# Patient Record
Sex: FEMALE | Race: WHITE
[De-identification: names, ages, dates, MRNs, and addresses within clinical notes are randomized per-mention and may not be internally consistent; named-entity substitution may affect disease eponyms.]

---

## 2017-02-25 ENCOUNTER — HOSPITAL ENCOUNTER (INPATIENT)
Dept: HOSPITAL 75 - ER | Age: 62
LOS: 5 days | Discharge: NURSING, REHAB OR LONG-TERM CARE | DRG: 494 | End: 2017-03-02
Attending: ORTHOPAEDIC SURGERY | Admitting: ORTHOPAEDIC SURGERY
Payer: MEDICAID

## 2017-02-25 VITALS — DIASTOLIC BLOOD PRESSURE: 58 MMHG | SYSTOLIC BLOOD PRESSURE: 129 MMHG

## 2017-02-25 VITALS — SYSTOLIC BLOOD PRESSURE: 118 MMHG | DIASTOLIC BLOOD PRESSURE: 60 MMHG

## 2017-02-25 VITALS — DIASTOLIC BLOOD PRESSURE: 60 MMHG | SYSTOLIC BLOOD PRESSURE: 118 MMHG

## 2017-02-25 VITALS — BODY MASS INDEX: 53.92 KG/M2 | HEIGHT: 62 IN | WEIGHT: 293 LBS

## 2017-02-25 DIAGNOSIS — G43.909: ICD-10-CM
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
## 2017-02-25 DIAGNOSIS — F32.9: ICD-10-CM

## 2017-02-25 DIAGNOSIS — F17.210: ICD-10-CM

## 2017-02-25 DIAGNOSIS — J44.9: ICD-10-CM

## 2017-02-25 DIAGNOSIS — E03.9: ICD-10-CM

## 2017-02-25 DIAGNOSIS — Z96.653: ICD-10-CM

## 2017-02-25 DIAGNOSIS — S82.851B: Primary | ICD-10-CM

## 2017-02-25 DIAGNOSIS — Z23: ICD-10-CM
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
## 2017-02-25 LAB
ALBUMIN SERPL-MCNC: 3.9 G/DL (ref 3.2–4.5)
ALT SERPL-CCNC: 20 U/L (ref 0–55)
ANION GAP SERPL CALC-SCNC: 10 MMOL/L (ref 5–14)
AST SERPL-CCNC: 21 U/L (ref 5–34)
BASOPHILS # BLD AUTO: 0 10^3/UL (ref 0–0.1)
BASOPHILS NFR BLD AUTO: 0 % (ref 0–10)
BILIRUB SERPL-MCNC: 0.5 MG/DL (ref 0.1–1)
BILIRUB UR QL STRIP: NEGATIVE
BUN SERPL-MCNC: 21 MG/DL (ref 7–18)
BUN/CREAT SERPL: 26
CALCIUM SERPL-MCNC: 8.3 MG/DL (ref 8.5–10.1)
CHLORIDE SERPL-SCNC: 113 MMOL/L (ref 98–107)
CO2 SERPL-SCNC: 20 MMOL/L (ref 21–32)
CREAT SERPL-MCNC: 0.81 MG/DL (ref 0.6–1.3)
EOSINOPHIL # BLD AUTO: 0.1 10^3/UL (ref 0–0.3)
EOSINOPHIL NFR BLD AUTO: 2 % (ref 0–10)
ERYTHROCYTE [DISTWIDTH] IN BLOOD BY AUTOMATED COUNT: 13.7 % (ref 10–14.5)
GFR SERPLBLD BASED ON 1.73 SQ M-ARVRAT: > 60 ML/MIN
GLUCOSE SERPL-MCNC: 107 MG/DL (ref 70–105)
KETONES UR QL STRIP: NEGATIVE
LEUKOCYTE ESTERASE UR QL STRIP: NEGATIVE
LYMPHOCYTES # BLD AUTO: 1.3 X 10^3 (ref 1–4)
LYMPHOCYTES NFR BLD AUTO: 20 % (ref 12–44)
MCH RBC QN AUTO: 29 PG (ref 25–34)
MCHC RBC AUTO-ENTMCNC: 33 G/DL (ref 32–36)
MCV RBC AUTO: 87 FL (ref 80–99)
MONOCYTES # BLD AUTO: 0.4 X 10^3 (ref 0–1)
MONOCYTES NFR BLD AUTO: 6 % (ref 0–12)
NEUTROPHILS # BLD AUTO: 4.7 X 10^3 (ref 1.8–7.8)
NEUTROPHILS NFR BLD AUTO: 72 % (ref 42–75)
NITRITE UR QL STRIP: NEGATIVE
PH UR STRIP: 5 [PH] (ref 5–9)
PLATELET # BLD: 216 10^3/UL (ref 130–400)
PMV BLD AUTO: 9.5 FL (ref 7.4–10.4)
POTASSIUM SERPL-SCNC: 3.7 MMOL/L (ref 3.6–5)
PROT SERPL-MCNC: 6.8 G/DL (ref 6.4–8.2)
PROT UR QL STRIP: (no result)
RBC # BLD AUTO: 4.75 10^6/UL (ref 4.35–5.85)
SODIUM SERPL-SCNC: 143 MMOL/L (ref 135–145)
SP GR UR STRIP: 1.02 (ref 1.02–1.02)
SQUAMOUS #/AREA URNS HPF: (no result) /HPF
UROBILINOGEN UR-MCNC: NORMAL MG/DL
WBC # BLD AUTO: 6.5 10^3/UL (ref 4.3–11)
WBC #/AREA URNS HPF: (no result) /HPF

## 2017-02-25 PROCEDURE — 0QSGXZZ REPOSITION RIGHT TIBIA, EXTERNAL APPROACH: ICD-10-PCS | Performed by: ORTHOPAEDIC SURGERY

## 2017-02-25 PROCEDURE — 51702 INSERT TEMP BLADDER CATH: CPT

## 2017-02-25 PROCEDURE — 81000 URINALYSIS NONAUTO W/SCOPE: CPT

## 2017-02-25 PROCEDURE — 73600 X-RAY EXAM OF ANKLE: CPT

## 2017-02-25 PROCEDURE — 71010: CPT

## 2017-02-25 PROCEDURE — 96375 TX/PRO/DX INJ NEW DRUG ADDON: CPT

## 2017-02-25 PROCEDURE — 93005 ELECTROCARDIOGRAM TRACING: CPT

## 2017-02-25 PROCEDURE — 87081 CULTURE SCREEN ONLY: CPT

## 2017-02-25 PROCEDURE — 85018 HEMOGLOBIN: CPT

## 2017-02-25 PROCEDURE — 36415 COLL VENOUS BLD VENIPUNCTURE: CPT

## 2017-02-25 PROCEDURE — 93041 RHYTHM ECG TRACING: CPT

## 2017-02-25 PROCEDURE — 0QSG35Z REPOSITION RIGHT TIBIA WITH EXTERNAL FIXATION DEVICE, PERCUTANEOUS APPROACH: ICD-10-PCS | Performed by: ORTHOPAEDIC SURGERY

## 2017-02-25 PROCEDURE — 85014 HEMATOCRIT: CPT

## 2017-02-25 PROCEDURE — 96365 THER/PROPH/DIAG IV INF INIT: CPT

## 2017-02-25 PROCEDURE — 80053 COMPREHEN METABOLIC PANEL: CPT

## 2017-02-25 PROCEDURE — 29515 APPLICATION SHORT LEG SPLINT: CPT

## 2017-02-25 PROCEDURE — 73700 CT LOWER EXTREMITY W/O DYE: CPT

## 2017-02-25 PROCEDURE — 73610 X-RAY EXAM OF ANKLE: CPT

## 2017-02-25 PROCEDURE — 85025 COMPLETE CBC W/AUTO DIFF WBC: CPT

## 2017-02-25 PROCEDURE — 82962 GLUCOSE BLOOD TEST: CPT

## 2017-02-25 RX ADMIN — OXYCODONE HYDROCHLORIDE AND ACETAMINOPHEN PRN EACH: 7.5; 325 TABLET ORAL at 22:48

## 2017-02-25 RX ADMIN — SODIUM CHLORIDE, SODIUM LACTATE, POTASSIUM CHLORIDE, AND CALCIUM CHLORIDE PRN MLS/HR: 600; 310; 30; 20 INJECTION, SOLUTION INTRAVENOUS at 13:45

## 2017-02-25 RX ADMIN — SODIUM CHLORIDE, SODIUM LACTATE, POTASSIUM CHLORIDE, AND CALCIUM CHLORIDE PRN MLS/HR: 600; 310; 30; 20 INJECTION, SOLUTION INTRAVENOUS at 14:50

## 2017-02-25 RX ADMIN — CEFAZOLIN SCH MLS/HR: 10 INJECTION, POWDER, FOR SOLUTION INTRAVENOUS at 22:00

## 2017-02-25 RX ADMIN — SODIUM CHLORIDE, SODIUM LACTATE, POTASSIUM CHLORIDE, AND CALCIUM CHLORIDE SCH MLS/HR: 600; 310; 30; 20 INJECTION, SOLUTION INTRAVENOUS at 17:39

## 2017-02-25 RX ADMIN — Medication SCH ML: at 22:00

## 2017-02-25 NOTE — XMS REPORT
Continuity of Care Document

 Created on: 2015



BELKIS ROSARIO

External Reference #: [id number]

: 1955

Sex: Female



Demographics







 Address  [address]

[place]  [address]

 

 Home Phone  [phone]

 

 Preferred Language  English

 

 Marital Status  Unknown

 

 Scientologist Affiliation  Unknown

 

 Race  Unknown

 

 Ethnic Group  Unknown





Author







 Author  [facility]

 

 Organization  [facility]

 

 Address  Unknown

 

 Phone  Unavailable







Support







 Name  Relationship  Address  Phone

 

 MAUREEN PENG DO  Caregiver  [address] [place]  [address] [phone]

 

 JAYLENE PIPER MD  Caregiver  [address]  [address] [phone]

 

 TADEO ALEXIS  Next Of Kin  [address] [place]  [address] [phone]







Care Team Providers







 Care Team Member Name  Role  Phone

 

 JAYLENE PIPER MD  PCP  [phone]







Insurance Providers







 Payer Name  Policy Number  Subscriber Name  Relationship

 

 [facility]  [id number]  Belkis Roasrio  20 Self / Same As Patient







Advance Directives







 Directive  Response  Recorded Date/Time

 

 Advance Directives  No  04/28/15 11:05am

 

 Health Care Power of   No  04/28/15 11:05am

 

 Organ Donor  No  04/28/15 11:05am

 

 Resuscitation Status  Full Code  04/28/15 11:05am







Problems

No known problems or medical conditions.



Medications







 Medication  Dose  Route  Sig  Days/Qty  Instructions  Order Date  Discontinued 
Date  Status

 

 Trazodone HCl  300 Mg  PO  DAILY        08  Discontinued

 

 Citalopram Hydrobromide                 03/21/08  03/12/10  Discontinued

 

 Oxybutynin Chloride  5 Mg  PO  TWICE A DAY        08     Active

 

 Albuterol                 08  Discontinued

 

 [Symbicort]                 08  Discontinued

 

 Naproxen  1 Each  PO  BID PRN  20 Qty     10/15/09  08/01/11  Discontinued

 

 Acetaminophen/Hydrocodone Bitart  1 Ea  PO  Q 4 - 6 HR PRN  10 Qty     10/15/
09  03/12/10  Discontinued

 

 Propoxyphene HCl/Acetaminophen                 10/15/09  03/12/10  Discontinued

 

 Duloxetine HCl  90 Mg  PO  DAILY        03/12/10     Active

 

 Famotidine (Pepcid)  1 Each  PO  DAILY  15 Qty  FOR STOMACH  03/12/10  08/01/
11  Discontinued

 

 Metoclopramide HCl (Reglan)  1 Each  PO  BEFORE MEALS AND AT BEDTIME  20 Qty  
   03/12/10  08/01/11  Discontinued

 

 [Pink Pain Pill]                 04/24/10  03/18/11  Discontinued

 

 Cephalexin Monohydrate (Keflex)  1 Each  PO  FOUR TIMES DAILY  7 Days     04/24
/10  08/01/11  Discontinued

 

 Diclofenac Sodium  75 Mg  PO  DAILY        11  Discontinued

 

 Propranolol Hcl  40 Mg  PO  TWICE A DAY        11  Discontinued

 

 [Diflucan]  100     DAILY  5 Qty     11  Discontinued

 

 Trazodone Hcl  150 Mg  PO  BEDTIME     1-2 as needed  07/11/12  04/28/15  
Discontinued

 

 Diclofenac Sodium  50 Mg  PO  TWICE A DAY        07/11/12  04/28/15  
Discontinued

 

 Topiramate  2 Tab  PO  DAILY        12  Discontinued

 

 Topiramate  1 Tab  PO  BEDTIME        12  Discontinued

 

 Tolterodine Tartrate  1 Each  PO  TWICE A DAY        12  
Discontinued

 

 Imipramine Hcl  25 Mg  PO  BEDTIME        12  Discontinued

 

 Prednisone  20 Mg  PO  TWICE A DAY  3 Days     12  Discontinued

 

 Budesonide/Formoterol Fumarate  10.2 Gm  IH           13     Active

 

 Desmopressin Acetate  0.6 Mg  PO  BEDTIME        13     Active

 

 Fesoterodine Fumarate  8 Mg  PO           13     Active

 

 Simvastatin  10 Mg  PO  BEDTIME        13     Active

 

 Topiramate  2 Tab  PO  DAILY  30 Qty     13     Active

 

 Sumatriptan Succinate  50 Mg  PO           13     Active

 

 Methylprednisolone  1 Packet  PO  AS DIRECTED  1 Qty     06/14/13  04/28/15  
Discontinued

 

 Azithromycin  1 Tab  PO  DAILY  4 Days     06/14/13  04/28/15  Discontinued

 

 Benzonatate (Tessalon Perles)  1 Each  PO  TID PRN  14 Qty     06/14/13  04/28/
15  Discontinued

 

 Levothyroxine Sodium (Levothroid)  100 Mcg  PO  DAILY        04/28/15     
Active

 

 Naproxen  500 Mg  PO  DAILY        04/28/15     Active

 

 Mirtazapine (Remeron)  1 Each  PO  BEDTIME        04/28/15     Active

 

 Tramadol Hcl  50 Mg  PO           04/28/15     Active

 

 Albuterol Sulfate (Proventil Nebs)  1 Each  IH  Q 4 - 6 HRS PRN        04/28/
15     Active







Social History







 Social History Problem  Response  Recorded Date/Time

 

 Alcohol Use  Denies Use  2013 8:59pm

 

 Recreational Drug Use  No  2013 8:59pm

 

 Recent Foreign Travel  No  2015 11:05am

 

 Smoking Status  Current Everyday Smoker  2015 11:05am

 

 Do you dip or chew tobacco?  No  2015 11:05am









 Query  Response  Start Date  Stop Date

 

 Smoking Status  Current Everyday Smoker      







Hospital Discharge Instructions

No hospital discharge instructions.



Plan of Care

No plan of care.



Functional Status

No functional status results.



Allergies, Adverse Reactions, Alerts







 Allergen  Type  Severity  Reaction  Status  Last Updated

 

 No Known Drug Allergies           Active  08

 

 Codeine  Allergy  Mild     Active  10/15/09







Immunizations







 Name  Given  Type

 

 Date of Pneumonia Vaccine  11  Historical







Vital Signs

Acute Vital Signs





 Vital  Response  Date/Time

 

 Temperature (Fahrenheit)  97.7 degrees F (97.6 - 99.5)   

 

 Temperature (Calculated Celsius)  36.35815 degrees C (36.4 - 37.5)   

 

 Temperature Source  Tympanic     

 

 Pulse Rate (adult)  85 bpm (60 - 90)   

 

 Respiratory Rate  20 bpm (12 - 24)   

 

 O2 Sat by Pulse Oximetry  95 % (88 - 100)   

 

 Blood Pressure  130/80 mm Hg   

 

 Pain      

 

    Pain Intensity  0     

 

 Height (Feet)  5 feet    

 

 Height (Inches)  3.00 inches    

 

 Height (Calculated Centimeters)  160.830001 cm    

 

 Weight (Pounds)  290 pounds    

 

 Weight (Calculated Grams)  987681.789 gm    

 

 Weight (Calculated Kilograms)  131.902840 kilograms    

 

 Height  5 ft 3 in    

 

 Weight  290 lb    

 

 Body Mass Index  51.4 kg/m^2    







Results

Laboratory Results







 Test Name  Result  Units  Flags  Reference  Collection Date/Time  Result Date/
Time  Comments

 

 White Blood Count  5.1  10^3/uL     4.3-11.0  2015 11:05am  2015 11
:23am   

 

 Red Blood Count  4.81  10^6/uL     4.35-5.85  2015 11:05am  2015 11
:23am   

 

 Hemoglobin  14.0  G/DL     11.5-16.0  2015 11:05am  2015 11:23am   

 

 Hematocrit  41  %     35-52  2015 11:05am  2015 11:23am   

 

 Mean Corpuscular Volume  85  FL     80-99  2015 11:05am  2015 11:
23am   

 

 Mean Corpuscular Hemoglobin  29  PG     25-34  2015 11:05am  2015 
11:23am   

 

 Mean Corpuscular Hemoglobin Concent  34  G/DL     32-36  2015 11:05am   11:23am   

 

 Red Cell Distribution Width  13.2  %     10.0-14.5  2015 11:05am  2015 11:23am   

 

 Platelet Count  206  10^3/uL     130-400  2015 11:05am  2015 11:
23am   

 

 Mean Platelet Volume  9.5  FL     7.4-10.4  2015 11:05am  2015 11:
23am   

 

 Neutrophils (%) (Auto)  68  %     42-75  2015 11:05am  2015 11:
23am   

 

 Lymphocytes (%) (Auto)  23  %     12-44  2015 11:05am  2015 11:
23am   

 

 Monocytes (%) (Auto)  6  %     0-12  2015 11:05am  2015 11:23am   

 

 Eosinophils (%) (Auto)  2  %     0-10  2015 11:05am  2015 11:23am 
  

 

 Basophils (%) (Auto)  0  %     0-10  2015 11:05am  2015 11:23am   

 

 Neutrophils # (Auto)  3.5  X 10^3     1.8-7.8  2015 11:05am  2015 
11:23am   

 

 Lymphocytes # (Auto)  1.2  X 10^3     1.0-4.0  2015 11:05am  2015 
11:23am   

 

 Monocytes # (Auto)  0.3  X 10^3     0.0-1.0  2015 11:05am  2015 11:
23am   

 

 Eosinophils # (Auto)  0.1  10^3/uL     0.0-0.3  2015 11:05am  2015 
11:23am   

 

 Basophils # (Auto)  0.0  10^3/uL     0.0-0.1  2015 11:05am  2015 11
:23am   

 

 Prothrombin Time  12.5  SEC     12.2-14.7  2015 11:05am  2015 11:
31am   

 

 INR Comment  1.0        0.8-1.4  2015 11:052015 11:31am       
                INTERPRETIVE DATA

SUGGESTED THERAPEUTIC RANGE FOR INR'S:

   VENOUS THROMBOSIS, PULMONARY EMBOLISM, OR PREVENTION OF

   SYSTEMIC EMBOLISM (EG. IN ATRIAL FIBRILLATION):

                     2.0  -  3.0

   MECHANICAL PROSTHETIC HEART VALVES:

                     2.5  -  3.5*

*NOTE:  INR'S UP TO 4.5 MAY BE NECESSARY IN SELECTED GROUPS 

        OF HIGH RISK PATIENTS.

[employer] CONSENSUS

CONFERENCE ON ANTITHROMBOTIC THERAPY (2000).

 

 Sodium Level  142  MMOL/L     135-145  2015 11:05am  2015 11:37am 
  

 

 Potassium Level  3.7  MMOL/L     3.6-5.0  2015 11:05am  2015 11:
37am   

 

 Chloride Level  110  MMOL/L  H    2015 11:052015 11:37am
   

 

 Carbon Dioxide Level  23  MMOL/L     21-32  2015 11:05am  2015 11:
37am   

 

 Blood Urea Nitrogen  14  MG/DL     7-18  2015 11:052015 11:
37am   

 

 Creatinine  0.75  MG/DL     0.60-1.30  2015 11:05am  2015 11:37am 
  

 

 BUN/Creatinine Ratio  19           2015 11:05am  2015 11:37am   

 

 Estimat Glomerular Filtration Rate  > 60           2015 11:052015 11:37am                    GFR INTERPRETIVE DATA



         UNITS FOR ESTIMATED GFR (eGFR): mL/min/1.73 M2

         REFERENCE RANGE FOR ESTIMATED GFR (eGFR)

                                          eGFR

         NORMAL eGFR                       >60

         MODERATELY DECREASED eGFR          30-59

         SEVERLY DECREASED eGFR             15-29

         KIDNEY FAILURE                    <15 (OR DIALYSIS)

 

 Glucose Level  109  MG/DL  H    2015 11:05am  2015 11:37am   

 

 Calcium Level  9.1  MG/DL     8.5-10.1  2015 11:05am  2015 11:37am
   







Procedures







 Procedure  Status  Date  Provider(s)

 

 Diagnostic colonoscopy  completed  04/28/15  MAUREEN PENG DO

 

 Diagnostic colonoscopy  completed  04/28/15  MAUREEN PENG DO







Encounters







 Encounter  Location  Date/Time

 

 Registered Surgical Day Care  [facility]  04/28/15 10:27am

 

 Registered Clinic  [facility]  04/24/15 7:31am

## 2017-02-25 NOTE — DIAGNOSTIC IMAGING REPORT
EXAMINATION: Portable ereCT AP chest at 1242h.



INDICATION: Preop ankle fracture



Heart size is within normal limits and stable when compared to

6/14/13. The lungs are clear. There is no evidence for a

failure, pneumonia or for a pleural effusion. The mediastinum is

not widened. The osseous structures are intact.



IMPRESSION:



There is no evidence for an acute cardiopulmonary abnormality.



Dictated by:



Dictated on workstation # [id number]

## 2017-02-25 NOTE — ED LOWER EXTREMITY
General


Chief Complaint:  Trauma-Non Activation


Stated Complaint:  OPEN ANKLE FX


Source:  patient, EMS


Exam Limitations:  no limitations





History of Present Illness


Time seen by provider:  11:20


Initial Comments


The patient is a 61-year-old white female brought by ambulance.  She reported 

that she had gotten up this morning to go to the bathroom.  Her knee buckled 

and she fell to the floor.  She was unable to get up.  When EMS arrived they 

found that she had an open fracture possible dislocation of the right ankle.  

It is noted that she has previous bilateral total knees and a previous 

operative left ankle repair.


Onset:  just prior to arrival, this morning


Severity:  moderate, severe


Pain/Injury Location:  right ankle


Method of Injury:  fell, twisted





Allergies and Home Medications


Allergies


Coded Allergies:  


     codeine (Unverified  Allergy, Mild, 10/15/09)





Home Medications


Albuterol Sulfate 0.63 Mg/3 Ml Vial.neb 1 EACH IH Q 4 - 6 HRS PRN (Reported) 


Budesonide/Formoterol Fumarate 10.2 Gm Hfa.aer.ad 10.2 GM IH (Reported) 


Desmopressin Acetate 0.2 Mg Tablet 0.6 MG PO HS (Reported) 


Duloxetine Hcl 60 Mg Capsule.dr 90 MG PO DAILY (Reported) 


Fesoterodine Fumarate 8 Mg Tab.sr.24h 8 MG PO (Reported) 


Levothyroxine Sodium 100 Mcg Tablet 100 MCG PO DAILY (Reported) 


Mirtazapine 15 Mg Tablet 1 EACH PO HS (Reported) 


Naproxen 500 Mg Tablet 500 MG PO DAILY (Reported) 


Oxybutynin Chloride 5 Mg Tablet 5 MG PO BID (Reported) 


Simvastatin 10 Mg Tablet 10 MG PO HS (Reported) 


Sumatriptan Succinate 50 Mg Tablet 50 MG PO (Reported) 


Topiramate 25 Mg Tablet #30 2 TAB PO DAILY (Reported) 


Tramadol Hcl 50 Mg Tablet 50 MG PO (Reported) 





Constitutional:   see HPI


EENTM:   no symptoms reported


Respiratory:   no symptoms reported


Cardiovascular:   no symptoms reported


Gastrointestinal:   no symptoms reported


Genitourinary:   no symptoms reported


Musculoskeletal:   see HPI


Psychiatric/Neurological:   No Symptoms Reported





Past Medical-Social-Family Hx


Patient Social History


Alcohol Use:  Denies Use


Recreational Drug Use:  No


Smoking Status:  Current Everyday Smoker


Recent Hopitalizations:  Yes





Immunizations Up To Date


Date of Pneumonia Vaccine:  Apr 1, 2011





Surgeries


HX Surgeries:  Yes (c-sections,knee and ankle surgery)





Respiratory


Hx Respiratory Disorders:  Yes (COPD)


Respiratory Disorders:  Asthma, Chronic Bronchitis, COPD





Cardiovascular


Hx Cardiac Disorders:  No





Neurological


Hx Neurological Disorders:  No





Reproductive System


Hx Reproductive Disorders:  No





Genitourinary


Hx Genitourinary Disorders:  No





Gastrointestinal


Hx Gastrointestinal Disorders:  No





Musculoskeletal


Hx Musculoskeletal Disorders:  Yes (ARTHRITIS)


Musculoskeletal Disorders:  Arthritis





Endocrine


Hx Endocrine Disorders:  Yes


Endocrine Disorders:  Hypothyroidsim





HEENT


HX ENT Disorders:  No





Cancer


Hx Cancer:  No





Psychosocial


Hx Psychiatric Problems:  Yes


Behavioral Health Disorders:  Depression





Integumentary


HX Skin/Integumentary Disorder:  No





Blood Transfusions


Hx Blood Disorders:  No





Physical Exam


Vital Signs


Capillary Refill :


General Appearance:   moderate distress


HEENT:   normal ENT inspection


Neck:   non-tender full range of motion supple normal inspection


Cardiovascular:   normal peripheral pulses regular rate, rhythm no edema no 

gallop no JVD no murmur


Respiratory:   chest non-tender lungs clear normal breath sounds no respiratory 

distress no accessory muscle use


Gastrointestinal:   normal bowel sounds non tender soft no organomegaly no 

pulsatile mass


Neurologic/Psychiatric:   CNs II-XII nml as tested no motor/sensory deficits 

alert normal mood/affect oriented x 3


Skin:   normal color warm/dry


Comments


the patient has obvious deformity of the right ankle.  There is a large skin 

defect just distal to the medial malleolus.  This involves the area of the 

dorsalis pedis artery.  The dorsalis pedis is not palpated there is capillary 

return promptly on the toes.  The patient was discussed with Dr. Mendieta of the 

orthopedic service.  He was just about to scrub into an operation and 

graciously came to the emergency room to reduce the fracture.  This was 

accomplished with the use of 1 mg of hydromorphone and 20 mg IV push of 

etomidate.  The ankle was reduced with an obvious and satisfying thump.  It was 

then dressed and immobilized.  The patient was given 2 g of IV Ancef as a 

prophylaxis.  The plan is to go to the OR later today Dr. Mendieta arrived in 

the ER at approximately 1130.





Progress/Results/Core Measures


Results/Orders


Lab Results








 Laboratory Tests








Test


  2/25/17


11:32 Range/Units


 











My Orders





 Orders-RAYA WEBSTER MD


Hydromorphone Injection (Dilaudid Inject (2/25/17 11:28)


Etomidate Injection (Amidate Injection) (2/25/17 11:35)


Cefazolin  Injection (Ancef  Injection) (2/25/17 12:00)


Ekg Tracing (2/25/17 11:50)


Cbc With Automated Diff (2/25/17 11:50)


Comprehensive Metabolic Panel (2/25/17 11:50)


Ua Culture If Indicated (2/25/17 11:50)


Cefazolin  Injection (Ancef  Injection) (2/25/17 11:50)


Ankle, Right, 2 Views (2/25/17 11:50)





Medications Given in ED








 Current Medications








 Medications  Dose


 Ordered  Sig/Joseline


 Route  Start Time


 Stop Time Status Last Admin


Dose Admin


 


 Cefazolin Sodium  1,000 mg  STK-MED ONCE


 .ROUTE  2/25/17 11:50


 2/25/17 11:52 DC 2/25/17 11:53


1,000 MG


 


 Cefazolin Sodium/


 Sodium Chloride  50 ml @ 


 100 mls/hr  ONCE  ONCE


 IV  2/25/17 12:00


 2/25/17 12:29  2/25/17 11:52


100 MLS/HR


 


 Etomidate 20 mg  20 mg  STK-MED ONCE


 .ROUTE  2/25/17 11:35


 2/25/17 11:37 DC 2/25/17 11:37


20 MG


 


 Hydromorphone HCl  2 mg  STK-MED ONCE


 .ROUTE  2/25/17 11:28


 2/25/17 11:30 DC 2/25/17 11:31


1 MG














Departure


Impression


Impression:  


 Primary Impression:  


 open compound fracture dislocation right ankle


Disposition:  09 ADMITTED AS INPATIENT


Condition:  Improved





Departure-Patient Inst.


Referrals:  


JAYLENE PIPER MD (PCP/Family)


Primary Care Physician








RAYA WEBSTER MD Feb 25, 2017 11:38

## 2017-02-25 NOTE — DIAGNOSTIC IMAGING REPORT
INDICATION: Fractures post reduction



EXAMINATION: Right ankle 2/25/2017



central pulmonary venous congestion 2/25/2017  at 11:24 am.



FINDINGS:



3 views of the right ankle demonstrate overlying splint

obscuring fine bony detail. The previously noted displaced

fractures are in much better anatomic alignment. The distal

oblique fibular fracture and transverse fracture through the

medial malleolus fairly well aligned. A likely posterior

malleolar fracture also noted.



IMPRESSION:

Much better alignment of the known fractures as described above.



Dictated by:



Dictated on workstation # [id number]

## 2017-02-25 NOTE — XMS REPORT
Continuity of Care Document

 Created on: 2015



BELKIS ROSARIO

External Reference #: [id number]

: 1955

Sex: Female



Demographics







 Address  [address]

[place]  [address]

 

 Home Phone  [phone]

 

 Preferred Language  English

 

 Marital Status  Unknown

 

 [employer] Affiliation  Unknown

 

 Race  Unknown

 

 Ethnic Group  Unknown





Author







 Author  [facility]

 

 Organization  [facility]

 

 Address  Unknown

 

 Phone  Unavailable







Support







 Name  Relationship  Address  Phone

 

 MAUREEN PENG DO  Caregiver  [address] [place]  [address] [phone]

 

 JAYLENE PIPER MD  Caregiver  [address]  [address] [phone]

 

 TADEO ALEXIS  Next Of Kin  [address] [place]  [address] [phone]







Care Team Providers







 Care Team Member Name  Role  Phone

 

 JAYLENE PIPER MD  PCP  [phone]







Insurance Providers







 Payer Name  Policy Number  Subscriber Name  Relationship

 

 [facility]  [id number]  Belkis Rosario  20 Self / Same As Patient







Advance Directives







 Directive  Response  Recorded Date/Time

 

 Advance Directives  No  04/28/15 11:05am

 

 Health Care Power of   No  04/28/15 11:05am

 

 Organ Donor  No  04/28/15 11:05am

 

 Resuscitation Status  Full Code  04/28/15 11:05am







Problems

No known problems or medical conditions.



Medications







 Medication  Dose  Route  Sig  Days/Qty  Instructions  Order Date  Discontinued 
Date  Status

 

 Trazodone HCl  300 Mg  PO  DAILY        08  Discontinued

 

 Citalopram Hydrobromide                 03/21/08  03/12/10  Discontinued

 

 Oxybutynin Chloride  5 Mg  PO  TWICE A DAY        08     Active

 

 Albuterol                 08  Discontinued

 

 [Symbicort]                 08  Discontinued

 

 Naproxen  1 Each  PO  BID PRN  20 Qty     10/15/09  08/01/11  Discontinued

 

 Acetaminophen/Hydrocodone Bitart  1 Ea  PO  Q 4 - 6 HR PRN  10 Qty     10/15/
09  03/12/10  Discontinued

 

 Propoxyphene HCl/Acetaminophen                 10/15/09  03/12/10  Discontinued

 

 Duloxetine HCl  90 Mg  PO  DAILY        03/12/10     Active

 

 Famotidine (Pepcid)  1 Each  PO  DAILY  15 Qty  FOR STOMACH  03/12/10  08/01/
11  Discontinued

 

 Metoclopramide HCl (Reglan)  1 Each  PO  BEFORE MEALS AND AT BEDTIME  20 Qty  
   03/12/10  08/01/11  Discontinued

 

 [Pink Pain Pill]                 04/24/10  03/18/11  Discontinued

 

 Cephalexin Monohydrate (Keflex)  1 Each  PO  FOUR TIMES DAILY  7 Days     04/24
/10  08/01/11  Discontinued

 

 Diclofenac Sodium  75 Mg  PO  DAILY        11  Discontinued

 

 Propranolol Hcl  40 Mg  PO  TWICE A DAY        11  Discontinued

 

 [Diflucan]  100     DAILY  5 Qty     11  Discontinued

 

 Trazodone Hcl  150 Mg  PO  BEDTIME     1-2 as needed  07/11/12  04/28/15  
Discontinued

 

 Diclofenac Sodium  50 Mg  PO  TWICE A DAY        07/11/12  04/28/15  
Discontinued

 

 Topiramate  2 Tab  PO  DAILY        12  Discontinued

 

 Topiramate  1 Tab  PO  BEDTIME        12  Discontinued

 

 Tolterodine Tartrate  1 Each  PO  TWICE A DAY        12  
Discontinued

 

 Imipramine Hcl  25 Mg  PO  BEDTIME        12  Discontinued

 

 Prednisone  20 Mg  PO  TWICE A DAY  3 Days     12  Discontinued

 

 Budesonide/Formoterol Fumarate  10.2 Gm  IH           13     Active

 

 Desmopressin Acetate  0.6 Mg  PO  BEDTIME        13     Active

 

 Fesoterodine Fumarate  8 Mg  PO           13     Active

 

 Simvastatin  10 Mg  PO  BEDTIME        13     Active

 

 Topiramate  2 Tab  PO  DAILY  30 Qty     13     Active

 

 Sumatriptan Succinate  50 Mg  PO           13     Active

 

 Methylprednisolone  1 Packet  PO  AS DIRECTED  1 Qty     06/14/13  04/28/15  
Discontinued

 

 Azithromycin  1 Tab  PO  DAILY  4 Days     06/14/13  04/28/15  Discontinued

 

 Benzonatate (Tessalon Perles)  1 Each  PO  TID PRN  14 Qty     06/14/13  04/28/
15  Discontinued

 

 Levothyroxine Sodium (Levothroid)  100 Mcg  PO  DAILY        04/28/15     
Active

 

 Naproxen  500 Mg  PO  DAILY        04/28/15     Active

 

 Mirtazapine (Remeron)  1 Each  PO  BEDTIME        04/28/15     Active

 

 Tramadol Hcl  50 Mg  PO           04/28/15     Active

 

 Albuterol Sulfate (Proventil Nebs)  1 Each  IH  Q 4 - 6 HRS PRN        04/28/
15     Active







Social History







 Social History Problem  Response  Recorded Date/Time

 

 Alcohol Use  Denies Use  2013 8:59pm

 

 Recreational Drug Use  No  2013 8:59pm

 

 Recent Foreign Travel  No  2015 11:05am

 

 Smoking Status  Current Everyday Smoker  2015 11:05am

 

 Do you dip or chew tobacco?  No  2015 11:05am









 Query  Response  Start Date  Stop Date

 

 Smoking Status  Current Everyday Smoker      







Hospital Discharge Instructions

No hospital discharge instructions.



Plan of Care

No plan of care.



Functional Status

No functional status results.



Allergies, Adverse Reactions, Alerts







 Allergen  Type  Severity  Reaction  Status  Last Updated

 

 No Known Drug Allergies           Active  08

 

 Codeine  Allergy  Mild     Active  10/15/09







Immunizations







 Name  Given  Type

 

 Date of Pneumonia Vaccine  11  Historical







Vital Signs

Acute Vital Signs





 Vital  Response  Date/Time

 

 Temperature (Fahrenheit)  97.7 degrees F (97.6 - 99.5)   

 

 Temperature (Calculated Celsius)  36.65377 degrees C (36.4 - 37.5)   

 

 Temperature Source  Tympanic     

 

 Pulse Rate (adult)  85 bpm (60 - 90)   

 

 Respiratory Rate  20 bpm (12 - 24)   

 

 O2 Sat by Pulse Oximetry  95 % (88 - 100)   

 

 Blood Pressure  130/80 mm Hg   

 

 Pain      

 

    Pain Intensity  0     

 

 Height (Feet)  5 feet    

 

 Height (Inches)  3.00 inches    

 

 Height (Calculated Centimeters)  160.813134 cm    

 

 Weight (Pounds)  290 pounds    

 

 Weight (Calculated Grams)  609625.789 gm    

 

 Weight (Calculated Kilograms)  131.767043 kilograms    

 

 Height  5 ft 3 in    

 

 Weight  290 lb    

 

 Body Mass Index  51.4 kg/m^2    







Results

Laboratory Results







 Test Name  Result  Units  Flags  Reference  Collection Date/Time  Result Date/
Time  Comments

 

 White Blood Count  5.1  10^3/uL     4.3-11.0  2015 11:05am  2015 11
:23am   

 

 Red Blood Count  4.81  10^6/uL     4.35-5.85  2015 11:05am  2015 11
:23am   

 

 Hemoglobin  14.0  G/DL     11.5-16.0  2015 11:05am  2015 11:23am   

 

 Hematocrit  41  %     35-52  2015 11:05am  2015 11:23am   

 

 Mean Corpuscular Volume  85  FL     80-99  2015 11:05am  2015 11:
23am   

 

 Mean Corpuscular Hemoglobin  29  PG     25-34  2015 11:05am  2015 
11:23am   

 

 Mean Corpuscular Hemoglobin Concent  34  G/DL     32-36  2015 11:05am   11:23am   

 

 Red Cell Distribution Width  13.2  %     10.0-14.5  2015 11:05am  2015 11:23am   

 

 Platelet Count  206  10^3/uL     130-400  2015 11:05am  2015 11:
23am   

 

 Mean Platelet Volume  9.5  FL     7.4-10.4  2015 11:05am  2015 11:
23am   

 

 Neutrophils (%) (Auto)  68  %     42-75  2015 11:05am  2015 11:
23am   

 

 Lymphocytes (%) (Auto)  23  %     12-44  2015 11:05am  2015 11:
23am   

 

 Monocytes (%) (Auto)  6  %     0-12  2015 11:05am  2015 11:23am   

 

 Eosinophils (%) (Auto)  2  %     0-10  2015 11:05am  2015 11:23am 
  

 

 Basophils (%) (Auto)  0  %     0-10  2015 11:05am  2015 11:23am   

 

 Neutrophils # (Auto)  3.5  X 10^3     1.8-7.8  2015 11:05am  2015 
11:23am   

 

 Lymphocytes # (Auto)  1.2  X 10^3     1.0-4.0  2015 11:05am  2015 
11:23am   

 

 Monocytes # (Auto)  0.3  X 10^3     0.0-1.0  2015 11:05am  2015 11:
23am   

 

 Eosinophils # (Auto)  0.1  10^3/uL     0.0-0.3  2015 11:05am  2015 
11:23am   

 

 Basophils # (Auto)  0.0  10^3/uL     0.0-0.1  2015 11:05am  2015 11
:23am   

 

 Prothrombin Time  12.5  SEC     12.2-14.7  2015 11:05am  2015 11:
31am   

 

 INR Comment  1.0        0.8-1.4  2015 11:052015 11:31am       
                INTERPRETIVE DATA

SUGGESTED THERAPEUTIC RANGE FOR INR'S:

   VENOUS THROMBOSIS, PULMONARY EMBOLISM, OR PREVENTION OF

   SYSTEMIC EMBOLISM (EG. IN ATRIAL FIBRILLATION):

                     2.0  -  3.0

   MECHANICAL PROSTHETIC HEART VALVES:

                     2.5  -  3.5*

*NOTE:  INR'S UP TO 4.5 MAY BE NECESSARY IN SELECTED GROUPS 

        OF HIGH RISK PATIENTS.

[employer] CONSENSUS

CONFERENCE ON ANTITHROMBOTIC THERAPY (2000).

 

 Sodium Level  142  MMOL/L     135-145  2015 11:05am  2015 11:37am 
  

 

 Potassium Level  3.7  MMOL/L     3.6-5.0  2015 11:05am  2015 11:
37am   

 

 Chloride Level  110  MMOL/L  H    2015 11:052015 11:37am
   

 

 Carbon Dioxide Level  23  MMOL/L     21-32  2015 11:05am  2015 11:
37am   

 

 Blood Urea Nitrogen  14  MG/DL     7-18  2015 11:052015 11:
37am   

 

 Creatinine  0.75  MG/DL     0.60-1.30  2015 11:05am  2015 11:37am 
  

 

 BUN/Creatinine Ratio  19           2015 11:05am  2015 11:37am   

 

 Estimat Glomerular Filtration Rate  > 60           2015 11:052015 11:37am                    GFR INTERPRETIVE DATA



         UNITS FOR ESTIMATED GFR (eGFR): mL/min/1.73 M2

         REFERENCE RANGE FOR ESTIMATED GFR (eGFR)

                                          eGFR

         NORMAL eGFR                       >60

         MODERATELY DECREASED eGFR          30-59

         SEVERLY DECREASED eGFR             15-29

         KIDNEY FAILURE                    <15 (OR DIALYSIS)

 

 Glucose Level  109  MG/DL  H    2015 11:05am  2015 11:37am   

 

 Calcium Level  9.1  MG/DL     8.5-10.1  2015 11:05am  2015 11:37am
   







Procedures







 Procedure  Status  Date  Provider(s)

 

 Diagnostic colonoscopy  completed  04/28/15  MAUREEN PENG DO

 

 Diagnostic colonoscopy  completed  04/28/15  MAUREEN PENG DO







Encounters







 Encounter  Location  Date/Time

 

 Registered [facility]  [facility]  04/28/15 10:27am

 

 Registered Clinic  [facility]  04/24/15 7:31am

## 2017-02-25 NOTE — PROGRESS NOTE-POST OPERATIVE
Post-Operative Progess Note


Pre-Operative Diagnosis


open fracture of right ankle





Post-Operative Diagnosis





Same





Post-Op Procedure Note


Date of Procedure:  Feb 25, 2017


Name of Procedure:  


irrigtion of openfracture right ankle


Procedure Note/Findings


easily reducible. no signs of foreign debris.


3 liter of pulse lavage. reduced and placement of Delta style Synthes ex fix. 

Tibia to calcaneus. The foot was near neutral flexion / extension so no 1st MT 

pin placed. X-Ray showed ankle aligned. Skin loosly closed with nylon suture


Anesthesia Type


Gen endo tracheal


Estimated blood loss (mL):  50ml


Packing:  


no


Specimen(s) collected


none








LUMA RAMAN MD Feb 25, 2017 16:31

## 2017-02-25 NOTE — HISTORY & PHYSICAL-HOSPITALIST
HPI


History of Present Illness:


HPI/Chief Complaint


Pt reports she fell just before arrival. Has history of right knee replacement 

and when standing up in the bathroom she felt her knee give out and she fell. 

She reports hearing her "ankle break" and seeing bones. Her son was with her 

and immediately called 911. EMS brought her here for evaluation. Ortho reduced 

the fracture under etomidate and plans to take to OR today.


Source:  patient, family


Exam Limitations:  no limitations


Date Seen


2/25/17


Attending Physician


Nataliya Perry Rachel L MD


Referring Physician





Date of Admission


 2/25/17





Home Medications & Allergies


Home Medications


Reviewed patient Home Medication Reconciliation Form





Allergies


Coded Allergies:  


     codeine (Unverified  Allergy, Mild, 10/15/09)





Past Medical-Social-Family Hx


Patient Social History


Marrital Status:  single


Number of Children:  3


Employed/Student:  unemployed


Alcohol Use:  Denies Use


Recreational Drug Use:  No


Smoking Status:  Current Everyday Smoker


Cigaretts per day:  10


Recent Hopitalizations:  Yes





Immunizations Up To Date


Date of Pneumonia Vaccine:  Apr 1, 2011





Surgeries


HX Surgeries:  Yes (c-sections,knee and ankle surgery)





Respiratory


Hx Respiratory Disorders:  Yes (COPD)


Respiratory Disorders:  Asthma





Cardiovascular


Hx Cardiovascular Disorders:  No





Neurological


Hx Neurological Disorders:  No





Reproductive System


Hx Reproductive Disorders:  No





Genitourinary


Hx Genitourinary Disorders:  Yes





Gastrointestinal


Hx Gastrointestinal Disorders:  No





Musculoskeletal


Hx Musculoskeletal Disorders:  Yes (ARTHRITIS)


Musculoskeletal Disorders:  Arthritis





Endocrine


Hx Endocrine Disorders:  Yes


Endocrine Disorders:  Hypothyroidsim





HEENT


HX ENT Disorders:  No





Cancer


Hx Cancer:  No





Psychosocial


Hx Psychiatric Problems:  Yes


Behavioral Health Disorders:  Depression





Integumentary


HX Skin/Integumentary Disorder:  No





Blood Transfusions


Hx Blood Disorders:  No





Family Medical History


Other Significan Family Hx:  


Mom: alive





Review of Systems


Constitutional:   no symptoms reported


EENTM:   no symptoms reported


Respiratory:   no symptoms reported


Cardiovascular:   no symptoms reported


Gastrointestinal:   no symptoms reported


Genitourinary:   incontinence


Musculoskeletal:   other (ankle pain per HPI)


Skin:   no symptoms reported


Psychiatric/Neurological:   Depressed Headache (chronic baseline)





Physical Exam


Physical Exam


Vital Signs





 Vital Sign - Last 12Hours








 2/25/17





 11:20


 


Temp 98.0


 


Pulse 90


 


Resp 18


 


B/P 143/88


 


Pulse Ox 98


 


O2 Delivery Nasal Cannula


 


O2 Flow Rate 2





Capillary Refill :


General Appearance:   WD/WN Anxious Other (appears uncomfortable)


Eyes:  Bilateral Eye EOMI, Bilateral Eye Normal Inspection


HEENT:   PERRL/EOMI Other (head: NC/AT, orpopharynx normal, MMM)


Respiratory:   Chest Non Tender Lungs Clear Normal Breath Sounds No Accessory 

Muscle Use No Respiratory Distress


Cardiovascular:   Regular Rate, Rhythm No Murmur Normal Peripheral Pulses


Gastrointestinal:   Normal Bowel Sounds Non Tender Soft


Rectal:   Deferred


Extremity:   Other (RLE: wrapped in ACE bandage following reduction, sensation 

intact in toes, REIS)


Neurologic/Psychiatric:   Alert Oriented x3 Other (tearful, anxious)


Skin:   Normal Color Warm/Dry





Results


Results/Procedures


Lab


Laboratory Tests


2/25/17 11:32














Assessment/Plan


Admission Diagnosis


Open ankle fracture





Assessment and Plan


1. Open ankle fracture


- To OR today per Ortho for ORIF


- Pain management per ortho


- Needs osteoporosis screening with PCP


- PT/Ot





2. Hypothyroidism


- Continue synthroid





3. Migraines


- Continue Topamax in AM





4. Asthma, no acute exacerbation


- Continue Albuterol nebs as needed


- Continue Symbicort 





DVT ppx:


- Will need Lovenox when ortho okays


- NPO








Dispo: OR today.





MD BILL Helm KATELYN M MD Feb 25, 2017 12:23

## 2017-02-25 NOTE — DIAGNOSTIC IMAGING REPORT
Right ankle.



INDICATION: Injury, ankle pain.



Oblique and lateral views were obtained. There are no prior

studies available for comparison.



FINDINGS: There is a fracture-dislocation of the ankle joint.

Specifically, the talus has been displaced laterally with

respect to the tibia. The distal tibia now overlies the medial

aspect of the talus by nearly 2 cm. Furthermore, there is a

displaced fracture of the distal fibula with the main distal

fracture fragment displaced laterally and overlying the proximal

fracture fragment by nearly 4 cm. There also appear to be

slightly comminuted, essentially nondisplaced fractures of the

distal tibia and the lateral malleolus as well. No other acute

bony abnormality is identified.



Incidental note is made of a prominent calcaneal spur.



There is deformity of soft tissues due to the injury to the

ankle joint.



IMPRESSION:

1. There is a severe fracture-dislocation of the ankle joint as

described above. A followup exam after reduction would be

recommended for further evaluation.

2. These results were discussed with JO Johnston in ER.



CRITICAL FINDING



Dictated by:



Dictated on workstation # [id number]

## 2017-02-25 NOTE — DIAGNOSTIC IMAGING REPORT
Fluoroscopy.



INDICATION: Ankle pain.



FINDINGS: Fluoroscopic assistance was provided for Dr. Mendieta

during his ORIF procedure of the right ankle. 39 seconds of

fluoroscopy time was utilized.



Multiple spot films were obtained. There are external fixation

devices involving the tibia and midfoot.



IMPRESSION: Fluoroscopic assistance was provided for Dr. Mendieta.



Dictated by:



Dictated on workstation # [id number]

## 2017-02-25 NOTE — XMS REPORT
Continuity of Care Document

 Created on: 2015



BELKIS ROSARIO

External Reference #: [id number]

: 1955

Sex: Female



Demographics







 Address  [address]

[place]  [address]

 

 Home Phone  [phone]

 

 Preferred Language  English

 

 Marital Status  Unknown

 

 Taoism Affiliation  Unknown

 

 Race  Unknown

 

 Ethnic Group  Unknown





Author







 Author  [facility]

 

 Organization  [facility]

 

 Address  Unknown

 

 Phone  Unavailable







Support







 Name  Relationship  Address  Phone

 

 MAUREEN PENG DO  Caregiver  [address] [place]  [address] [phone]

 

 JAYLENE PIPER MD  Caregiver  [address]  [address] [phone]

 

 TADEO ALEXIS  Next Of Kin  [address] [place]  [address] [phone]







Care Team Providers







 Care Team Member Name  Role  Phone

 

 JAYLENE PIPER MD  PCP  [phone]







Insurance Providers







 Payer Name  Policy Number  Subscriber Name  Relationship

 

 [facility]  [id number]  Belkis Rosario  18 Self / Same As Patient







Advance Directives







 Directive  Response  Recorded Date/Time

 

 Advance Directives  No  04/28/15 11:05am

 

 Health Care Power of   No  04/28/15 11:05am

 

 Organ Donor  No  04/28/15 11:05am

 

 Resuscitation Status  Full Code  04/28/15 11:05am







Problems

No known problems or medical conditions.



Medications







 Medication  Dose  Route  Sig  Days/Qty  Instructions  Order Date  Discontinued 
Date  Status

 

 Trazodone HCl  300 Mg  PO  DAILY        08  Discontinued

 

 Citalopram Hydrobromide                 03/21/08  03/12/10  Discontinued

 

 Oxybutynin Chloride  5 Mg  PO  TWICE A DAY        08     Active

 

 Albuterol                 08  Discontinued

 

 [Symbicort]                 08  Discontinued

 

 Naproxen  1 Each  PO  BID PRN  20 Qty     10/15/09  08/01/11  Discontinued

 

 Acetaminophen/Hydrocodone Bitart  1 Ea  PO  Q 4 - 6 HR PRN  10 Qty     10/15/
09  03/12/10  Discontinued

 

 Propoxyphene HCl/Acetaminophen                 10/15/09  03/12/10  Discontinued

 

 Duloxetine HCl  90 Mg  PO  DAILY        03/12/10     Active

 

 Famotidine (Pepcid)  1 Each  PO  DAILY  15 Qty  FOR STOMACH  03/12/10  08/01/
11  Discontinued

 

 Metoclopramide HCl (Reglan)  1 Each  PO  BEFORE MEALS AND AT BEDTIME  20 Qty  
   03/12/10  08/01/11  Discontinued

 

 [Pink Pain Pill]                 04/24/10  03/18/11  Discontinued

 

 Cephalexin Monohydrate (Keflex)  1 Each  PO  FOUR TIMES DAILY  7 Days     04/24
/10  08/01/11  Discontinued

 

 Diclofenac Sodium  75 Mg  PO  DAILY        11  Discontinued

 

 Propranolol Hcl  40 Mg  PO  TWICE A DAY        11  Discontinued

 

 [Diflucan]  100     DAILY  5 Qty     11  Discontinued

 

 Trazodone Hcl  150 Mg  PO  BEDTIME     1-2 as needed  07/11/12  04/28/15  
Discontinued

 

 Diclofenac Sodium  50 Mg  PO  TWICE A DAY        07/11/12  04/28/15  
Discontinued

 

 Topiramate  2 Tab  PO  DAILY        12  Discontinued

 

 Topiramate  1 Tab  PO  BEDTIME        12  Discontinued

 

 Tolterodine Tartrate  1 Each  PO  TWICE A DAY        12  
Discontinued

 

 Imipramine Hcl  25 Mg  PO  BEDTIME        12  Discontinued

 

 Prednisone  20 Mg  PO  TWICE A DAY  3 Days     12  Discontinued

 

 Budesonide/Formoterol Fumarate  10.2 Gm  IH           13     Active

 

 Desmopressin Acetate  0.6 Mg  PO  BEDTIME        13     Active

 

 Fesoterodine Fumarate  8 Mg  PO           13     Active

 

 Simvastatin  10 Mg  PO  BEDTIME        13     Active

 

 Topiramate  2 Tab  PO  DAILY  30 Qty     13     Active

 

 Sumatriptan Succinate  50 Mg  PO           13     Active

 

 Methylprednisolone  1 Packet  PO  AS DIRECTED  1 Qty     06/14/13  04/28/15  
Discontinued

 

 Azithromycin  1 Tab  PO  DAILY  4 Days     06/14/13  04/28/15  Discontinued

 

 Benzonatate (Tessalon Perles)  1 Each  PO  TID PRN  14 Qty     06/14/13  04/28/
15  Discontinued

 

 Levothyroxine Sodium (Levothroid)  100 Mcg  PO  DAILY        04/28/15     
Active

 

 Naproxen  500 Mg  PO  DAILY        04/28/15     Active

 

 Mirtazapine (Remeron)  1 Each  PO  BEDTIME        04/28/15     Active

 

 Tramadol Hcl  50 Mg  PO           04/28/15     Active

 

 Albuterol Sulfate (Proventil Nebs)  1 Each  IH  Q 4 - 6 HRS PRN        04/28/
15     Active







Social History







 Social History Problem  Response  Recorded Date/Time

 

 Alcohol Use  Denies Use  2013 8:59pm

 

 Recreational Drug Use  No  2013 8:59pm

 

 Recent Foreign Travel  No  2015 11:05am

 

 Smoking Status  Current Everyday Smoker  2015 11:05am

 

 Do you dip or chew tobacco?  No  2015 11:05am









 Query  Response  Start Date  Stop Date

 

 Smoking Status  Current Everyday Smoker      







Hospital Discharge Instructions

No hospital discharge instructions.



Plan of Care

No plan of care.



Functional Status

No functional status results.



Allergies, Adverse Reactions, Alerts







 Allergen  Type  Severity  Reaction  Status  Last Updated

 

 No Known Drug Allergies           Active  08

 

 Codeine  Allergy  Mild     Active  10/15/09







Immunizations







 Name  Given  Type

 

 Date of Pneumonia Vaccine  11  Historical







Vital Signs

Acute Vital Signs





 Vital  Response  Date/Time

 

 Temperature (Fahrenheit)  97.7 degrees F (97.6 - 99.5)   

 

 Temperature (Calculated Celsius)  36.42688 degrees C (36.4 - 37.5)   

 

 Temperature Source  Tympanic     

 

 Pulse Rate (adult)  85 bpm (60 - 90)   

 

 Respiratory Rate  20 bpm (12 - 24)   

 

 O2 Sat by Pulse Oximetry  95 % (88 - 100)   

 

 Blood Pressure  130/80 mm Hg   

 

 Pain      

 

    Pain Intensity  0     

 

 Height (Feet)  5 feet    

 

 Height (Inches)  3.00 inches    

 

 Height (Calculated Centimeters)  160.524527 cm    

 

 Weight (Pounds)  290 pounds    

 

 Weight (Calculated Grams)  256029.789 gm    

 

 Weight (Calculated Kilograms)  131.642596 kilograms    

 

 Height  5 ft 3 in    

 

 Weight  290 lb    

 

 Body Mass Index  51.4 kg/m^2    







Results

Laboratory Results







 Test Name  Result  Units  Flags  Reference  Collection Date/Time  Result Date/
Time  Comments

 

 White Blood Count  5.1  10^3/uL     4.3-11.0  2015 11:05am  2015 11
:23am   

 

 Red Blood Count  4.81  10^6/uL     4.35-5.85  2015 11:05am  2015 11
:23am   

 

 Hemoglobin  14.0  G/DL     11.5-16.0  2015 11:05am  2015 11:23am   

 

 Hematocrit  41  %     35-52  2015 11:05am  2015 11:23am   

 

 Mean Corpuscular Volume  85  FL     80-99  2015 11:05am  2015 11:
23am   

 

 Mean Corpuscular Hemoglobin  29  PG     25-34  2015 11:05am  2015 
11:23am   

 

 Mean Corpuscular Hemoglobin Concent  34  G/DL     32-36  2015 11:05am   11:23am   

 

 Red Cell Distribution Width  13.2  %     10.0-14.5  2015 11:05am  2015 11:23am   

 

 Platelet Count  206  10^3/uL     130-400  2015 11:05am  2015 11:
23am   

 

 Mean Platelet Volume  9.5  FL     7.4-10.4  2015 11:05am  2015 11:
23am   

 

 Neutrophils (%) (Auto)  68  %     42-75  2015 11:05am  2015 11:
23am   

 

 Lymphocytes (%) (Auto)  23  %     12-44  2015 11:05am  2015 11:
23am   

 

 Monocytes (%) (Auto)  6  %     0-12  2015 11:05am  2015 11:23am   

 

 Eosinophils (%) (Auto)  2  %     0-10  2015 11:05am  2015 11:23am 
  

 

 Basophils (%) (Auto)  0  %     0-10  2015 11:05am  2015 11:23am   

 

 Neutrophils # (Auto)  3.5  X 10^3     1.8-7.8  2015 11:05am  2015 
11:23am   

 

 Lymphocytes # (Auto)  1.2  X 10^3     1.0-4.0  2015 11:05am  2015 
11:23am   

 

 Monocytes # (Auto)  0.3  X 10^3     0.0-1.0  2015 11:05am  2015 11:
23am   

 

 Eosinophils # (Auto)  0.1  10^3/uL     0.0-0.3  2015 11:05am  2015 
11:23am   

 

 Basophils # (Auto)  0.0  10^3/uL     0.0-0.1  2015 11:05am  2015 11
:23am   

 

 Prothrombin Time  12.5  SEC     12.2-14.7  2015 11:05am  2015 11:
31am   

 

 INR Comment  1.0        0.8-1.4  2015 11:052015 11:31am       
                INTERPRETIVE DATA

SUGGESTED THERAPEUTIC RANGE FOR INR'S:

   VENOUS THROMBOSIS, PULMONARY EMBOLISM, OR PREVENTION OF

   SYSTEMIC EMBOLISM (EG. IN ATRIAL FIBRILLATION):

                     2.0  -  3.0

   MECHANICAL PROSTHETIC HEART VALVES:

                     2.5  -  3.5*

*NOTE:  INR'S UP TO 4.5 MAY BE NECESSARY IN SELECTED GROUPS 

        OF HIGH RISK PATIENTS.

[employer] CONSENSUS

CONFERENCE ON ANTITHROMBOTIC THERAPY (2000).

 

 Sodium Level  142  MMOL/L     135-145  2015 11:05am  2015 11:37am 
  

 

 Potassium Level  3.7  MMOL/L     3.6-5.0  2015 11:05am  2015 11:
37am   

 

 Chloride Level  110  MMOL/L  H    2015 11:052015 11:37am
   

 

 Carbon Dioxide Level  23  MMOL/L     21-32  2015 11:05am  2015 11:
37am   

 

 Blood Urea Nitrogen  14  MG/DL     7-18  2015 11:052015 11:
37am   

 

 Creatinine  0.75  MG/DL     0.60-1.30  2015 11:05am  2015 11:37am 
  

 

 BUN/Creatinine Ratio  19           2015 11:05am  2015 11:37am   

 

 Estimat Glomerular Filtration Rate  > 60           2015 11:052015 11:37am                    GFR INTERPRETIVE DATA



         UNITS FOR ESTIMATED GFR (eGFR): mL/min/1.73 M2

         REFERENCE RANGE FOR ESTIMATED GFR (eGFR)

                                          eGFR

         NORMAL eGFR                       >60

         MODERATELY DECREASED eGFR          30-59

         SEVERLY DECREASED eGFR             15-29

         KIDNEY FAILURE                    <15 (OR DIALYSIS)

 

 Glucose Level  109  MG/DL  H    2015 11:05am  2015 11:37am   

 

 Calcium Level  9.1  MG/DL     8.5-10.1  2015 11:05am  2015 11:37am
   







Procedures







 Procedure  Status  Date  Provider(s)

 

 Diagnostic colonoscopy  completed  04/28/15  MAUREEN PENG DO

 

 Diagnostic colonoscopy  completed  04/28/15  MAUREEN PENG DO







Encounters







 Encounter  Location  Date/Time

 

 Registered Surgical Day Care  [facility]  04/28/15 10:27am

 

 Registered Clinic  [facility]  04/24/15 7:31am

## 2017-02-26 VITALS — SYSTOLIC BLOOD PRESSURE: 95 MMHG | DIASTOLIC BLOOD PRESSURE: 47 MMHG

## 2017-02-26 VITALS — DIASTOLIC BLOOD PRESSURE: 68 MMHG | SYSTOLIC BLOOD PRESSURE: 113 MMHG

## 2017-02-26 VITALS — DIASTOLIC BLOOD PRESSURE: 53 MMHG | SYSTOLIC BLOOD PRESSURE: 99 MMHG

## 2017-02-26 VITALS — DIASTOLIC BLOOD PRESSURE: 55 MMHG | SYSTOLIC BLOOD PRESSURE: 112 MMHG

## 2017-02-26 VITALS — SYSTOLIC BLOOD PRESSURE: 117 MMHG | DIASTOLIC BLOOD PRESSURE: 55 MMHG

## 2017-02-26 VITALS — DIASTOLIC BLOOD PRESSURE: 80 MMHG | SYSTOLIC BLOOD PRESSURE: 124 MMHG

## 2017-02-26 VITALS — DIASTOLIC BLOOD PRESSURE: 53 MMHG | SYSTOLIC BLOOD PRESSURE: 106 MMHG

## 2017-02-26 RX ADMIN — DOCUSATE SODIUM AND SENNOSIDES SCH EA: 8.6; 5 TABLET, FILM COATED ORAL at 08:16

## 2017-02-26 RX ADMIN — Medication SCH ML: at 20:23

## 2017-02-26 RX ADMIN — DULOXETINE HYDROCHLORIDE SCH MG: 30 CAPSULE, DELAYED RELEASE ORAL at 20:12

## 2017-02-26 RX ADMIN — OXYCODONE HYDROCHLORIDE AND ACETAMINOPHEN PRN EACH: 7.5; 325 TABLET ORAL at 11:06

## 2017-02-26 RX ADMIN — IBUPROFEN PRN MG: 600 TABLET ORAL at 21:59

## 2017-02-26 RX ADMIN — Medication SCH ML: at 12:43

## 2017-02-26 RX ADMIN — OXYCODONE HYDROCHLORIDE AND ACETAMINOPHEN PRN EACH: 7.5; 325 TABLET ORAL at 04:17

## 2017-02-26 RX ADMIN — SODIUM CHLORIDE, SODIUM LACTATE, POTASSIUM CHLORIDE, AND CALCIUM CHLORIDE SCH MLS/HR: 600; 310; 30; 20 INJECTION, SOLUTION INTRAVENOUS at 04:28

## 2017-02-26 RX ADMIN — IBUPROFEN PRN MG: 600 TABLET ORAL at 08:51

## 2017-02-26 RX ADMIN — Medication SCH ML: at 06:31

## 2017-02-26 RX ADMIN — OXYCODONE HYDROCHLORIDE AND ACETAMINOPHEN PRN EACH: 7.5; 325 TABLET ORAL at 20:23

## 2017-02-26 RX ADMIN — DOCUSATE SODIUM SCH MG: 100 CAPSULE ORAL at 20:23

## 2017-02-26 RX ADMIN — CEFAZOLIN SCH MLS/HR: 10 INJECTION, POWDER, FOR SOLUTION INTRAVENOUS at 05:53

## 2017-02-26 NOTE — OPERATIVE REPORT
PROCEDURE PHYSICIAN:   LUMA MENDIETA

 

DATE OF PROCEDURE:  

02/25/2017

 

PREOPERATIVE DIAGNOSIS: 

Open fracture dislocation of right ankle with question of

circulatory disruption while the ankle was dislocated.  Good

circulation once relocated in the ER.

 

POSTOPERATIVE DIAGNOSIS: 

Open fracture dislocation of right ankle with question of

circulatory disruption while the ankle was relocated.  Good

circulation once relocated in the ER. 

 

PROCEDURE:

Irrigation of right ankle open fracture using 3 liters of pulse

lavage irrigation. 

 

SURGEON:

Luma Mendieta

 

ANESTHESIA:

General 

 

COMPLICATIONS: 

None

 

SPECIMENS: 

None.

 

FINDINGS: 

We had a very nice dislocation that looked good on x-ray.  We

washed it out and no foreign bodies or foreign debris was noted

in the wound in the wound at all.

 

NARRATIVE:

The patient was taken to the operating room number 4 after

standard nursing and anesthesia preoperative identification

evaluation and counseling.  The right lower extremity was

prepared and draped in the usual orthopedic fashion.  The ankle

was re-dislocated without making the incision any larger and the

skin was retracted a little with an [employer].  This allowed

pulse lavage irrigation in all of the areas that were exposed to

the outside and even underneath the skin, where relocation had

also exposed to the possibility of outside material and germs. 

No sharp debridement was necessary or needed or attempted.

 

C-arm was used to show that the ankle was nicely aligned at the

mortise.  Two pins were placed in the distal tibia just medial to

the crest.  Then a transcalcaneal pin was placed staying

posterior and distal as much a feasibly possible.  Delta frame

was then placed.  We are considering a first metatarsal frame but

the x-rays looked like excellent mortise reduction and the foot

was only plantar flexed about 5 or 10 degrees so we thought it

was adequate like that, no since for another pin.

 

The patient's wound was closed loosely with three simple sutures.

 Dressings were placed with Xeroform around the pins and then

half a roll of Kerlix wrapped around each of the pin sites to put

good pressure and to hold the skin tight in this area, which will

reduce the chance of infections.

 

The patient was then awakened and taken to the recovery room in

stable condition.

 

 

 

 

Job ID: [id number]

Dictated Date: 02/25/2017 16:37:06 

Transcription Date: 02/26/2017 16:12:51 / girish

## 2017-02-26 NOTE — PROGRESS NOTE-HOSPITALIST
Progress Note


HPI/CC on Admission


Fell shortly before arrival and had open fracture of ankle.





Progress Notes/Assess & Plan


Date Seen


2/26/17


Admission Dx/Process


Open ankle fracture


Diagonsis/Assessment & Plan


1. Open ankle fracture


- POD #1


- Pain per ortho


- Will start on Xarelto today as NWB





2. Hypothyroidism


- Continue home synthroid





3. Migraines


- Continue Topamax in AM





4. Asthma, no acute exacerbation


- Continue Albuterol nebs as needed


- Continue Symbicort 





FEN/GI


- Reg diet


- DC IVF





Dispo: Await assessment by PT. May need placement.  consulted.





MD BILL Helm KATELYN M MD Feb 26, 2017 11:39

## 2017-02-26 NOTE — PHYSICIAN PROGRESS NOTE
Progress Note


Assessment/Plan


Events since last exam


None


Assessment/Plan


HGB 13.1





Post Op day 1 Right open fx-dislocation of R ankle wash out and fixation. Post 

op anemia,





Vitals


Last set of Vitals Signs





Vital Signs








  Date Time  Temp Pulse Resp B/P Pulse Ox O2 Delivery O2 Flow Rate FiO2


 


17 08:00 97.4 85 20 124/80 97 Room Air  


 


17 07:50       2.00 











Labs


 Laboratory Tests


17 11:32: 


Alanine Aminotransferase (ALT/SGPT) 20, Albumin 3.9, Alkaline Phosphatase 103, 

Anion Gap 10, Aspartate Amino Transf (AST/SGOT) 21, BUN/Creatinine Ratio 26, 

Basophils # (Auto) 0.0, Basophils (%) (Auto) 0, Blood Urea Nitrogen 21H, 

Calcium Level 8.3L, Carbon Dioxide Level 20L, Chloride Level 113H, Creatinine 

0.81, Eosinophils # (Auto) 0.1, Eosinophils (%) (Auto) 2, Estimat Glomerular 

Filtration Rate > 60, Glucose Level 107H, Hematocrit 42, Hemoglobin 13.6, 

Lymphocytes # (Auto) 1.3, Lymphocytes (%) (Auto) 20, Mean Corpuscular 

Hemoglobin 29, Mean Corpuscular Hemoglobin Concent 33, Mean Corpuscular Volume 

87, Mean Platelet Volume 9.5, Monocytes # (Auto) 0.4, Monocytes (%) (Auto) 6, 

Neutrophils # (Auto) 4.7, Neutrophils (%) (Auto) 72, Platelet Count 216, 

Potassium Level 3.7, Red Blood Count 4.75, Red Cell Distribution Width 13.7, 

Sodium Level 143, Total Bilirubin 0.5, Total Protein 6.8, White Blood Count 6.5


17 12:25: 


Urine Bacteria TRACE, Urine Bilirubin NEGATIVE, Urine Casts NONE, Urine Clarity 

CLEAR, Urine Color YELLOW, Urine Crystals NONE, Urine Culture Indicated NO, 

Urine Glucose (UA) NEGATIVE, Urine Ketones NEGATIVE, Urine Leukocyte Esterase 

NEGATIVE, Urine Mucus NEGATIVE, Urine Nitrite NEGATIVE, Urine Protein 1+H, 

Urine RBC NONE, Urine RBC (Auto) NEGATIVE, Urine Specific Gravity 1.025H, Urine 

Squamous Epithelial Cells RARE, Urine Urobilinogen NORMAL, Urine WBC NONE, 

Urine pH 5


17 04:25: 


Hematocrit 37, Hemoglobin 12.2





Radiology


No new





Medications


Meds


S - She still has pain but better. She states she forgot she could tolerate 

Hydrocodone and Oxycodone and Morphine until last nigh, (in spite of her 

codeine allergy)





O - sanguinous drainage mild over wound. Ex Fix intact.





A & P above





Clinical Quality Measures


DVT/VTE Risk/Contraindication:


Risk Factor Score Per Nursin


RFS Level Per Nursing on Admit:  4+=Very High








LUMA RAMAN MD 2017 11:29 am

## 2017-02-26 NOTE — PHYSICAL THERAPY EVALUATION
PT Evaluation-General


Medical Diagnosis


Admission Date


Feb 25, 2017 at 16:45


Medical Diagnosis:  right ankle fx


Onset Date:  Feb 25, 2017





Therapy Diagnosis


Therapy Diagnosis:  weakness; abn gait





Height/Weight


Height (Feet):  5


Height (Inches):  2.00


Weight (Pounds):  330


Weight (Ounces):  0.0





Precautions


Precautions/Isolations:  Fall Prevention, Standard Precautions





Weight Bear Status


Weight Bearing Restriction:  Non Weight Bearing


Location Restriction:  R LE





Referral


Physician:  Anam


Reason for Referral:  Evaluation/Treatment





Medical History


Additional Medical History


Pt has had both knees replaced.


Current History


Pt was turning in the bathroom yesterday when she twisted and sustained a right 

ankle fracture.  Post repair with external fixator in place.


Reviewed History:  Yes





Social History


Home:  Single Level


Current Living Status:  Alone


Entry Into Home:  Level Entry


Senior housing in [place]; pt reports she plans to go to her son's home when she 

is able to mobilize.





Prior/Core FIM


Prior Level of Function


              Functional [place] Measure


0=Not Assessed/NA   4=Minimal Assistance


1=Total Assistance   5=Supervision or Setup


2=Maximal Assistance   6=Modified [place]


3=Moderate Assistance   7=Complete [place]


Pt was indep at her PLOF and was able to drive.  She reports she was able to do 

her own grocery shopping.





PT Evaluation-Current


Subjective


Pt reports she was turning in her bathroom yesterday and felt the bone break in 

her right ankle





Pain





   Numeric Pain Scale:  6


   Location:  Right


   Location Body Site:  Ankle


   Pain Description:  Stabbing, Acute





Objective


Patient Orientation:  Person, Place, Time, Situation


Problem Solving:  Fair


Attachments:  Robertson Catheter





ROM/Strength


ROM Lower Extremities


WFL; some limitations by soft tissue due to obesity.  


right ankle NT


Strenght Lower Extremities


Right LE grossly 4/5; ankle NT


Left LE grossly 4/5





Integumentary/Posture


Integumentary


intact


Bowel Incontinence:  No


Bladder Incontinence:  Robertson Cath


Posture


normal; symmetrical





Neuromuscular


(Tone, Coordination, Reflexes)


no noted functional deficits





Sensory


Vision:  Functional


Hearing:  Functional


Hand Dominance:  Right


Sensation Right Lower Extremit:  Intact


Sensation Left Lower Extremity:  Intact





Transfers


              Functional [place] Measure


0=Not Assessed/NA   4=Minimal Assistance


1=Total Assistance   5=Supervision or Setup


2=Maximal Assistance   6=Modified [place]


3=Moderate Assistance   7=Complete [place]


Pt is able to transfer sit to from supine with min assist and light assist with 

the right LE.  Attempted to stand with FWW, had 2 staff present and pt unable 

to come to a full stand despite max assist from this therapist and nurse. 

Attempted to stand x 3 reps.  Pt limited by weakness left LE and being NWB on 

the right.





Balance


Sitting Static:  Good


Sitting Dynamic:  Good





Treatment


Sat EOB several minutes; AP and LAQ on the left and LAQ on the right x 10 each.

  Worked on /  attempted sit to stand with FWW.  In bed post treatment with 

needs met.





Assessment/Needs


Post repair of left ankle fracture and is NWB.  Pt demonstrates inability to 

transfer to a stand and needs assist with bed mobility.  Pt will benefit from 

skilled PT to work on functional mobility and likely wheelchair mobillity until 

her wB status changes.


Rehab Potential:  Fair





PT Long Term Goals


Long Term Goals


PT Long Term Goals Time Frame:  Mar 2, 2017


Transfers (B,C,W/C) (FIM):  4


Gait (FIM):  2


Gait distance (FIM):  1=up to 49 ft


Gait Assistive Device:  FWW


Wheelchair (FIM):  4


Wheelchair distance (FIM):  3=150 ft





PT Plan


Problem List


Problem List:  Activity Tolerance, Functional Strength, Safety, Balance, Gait, 

Transfer, Bed Mobility





Treatment/Plan


Treatment Plan:  Continue Plan of Care


Treatment Plan:  Bed Mobility, Education, Functional Activity Ta, Functional 

Strength, Gait, Safety, Therapeutic Exercise, Transfers


Treatment Duration:  Mar 2, 2017


# of days/week


6


Visits Per Week:  11





Safety Risks/Education


Patient Education:  Transfer Techniques, Reviewed Precautions, Safety Issues


Teaching Recipient:  Patient


Teaching Methods:  Discussion


Response to Teaching:  Reinforcement Needed





Time/GCodes


Time In:  1003


Time Out:  1030


Total Billed Treatment Time:  27


Total Billed Treatment


visit


EVM 15


FA 12








HERBERT BRAY PT Feb 26, 2017 11:18

## 2017-02-26 NOTE — ANESTHESIA-GENERAL POST-OP
General


Patient Condition


Mental Status/LOC:  Same as Preop


Cardiovascular:  Satisfactory


Nausea/Vomiting:  Absent


Respiratory:  Satisfactory


Pain:  Controlled


Complications:  Absent





Post Op Complications


Complications


None





Follow Up Care/Instructions


Patient Instructions


None needed.





Anesthesia/Patient Condition


Patient Condition


Patient is doing well, no complaints, stable vital signs, no apparent adverse 

anesthesia problems.   


No complications reported per nursing.








LITZY STATON CRNA Feb 26, 2017 13:40

## 2017-02-27 VITALS — DIASTOLIC BLOOD PRESSURE: 66 MMHG | SYSTOLIC BLOOD PRESSURE: 111 MMHG

## 2017-02-27 VITALS — SYSTOLIC BLOOD PRESSURE: 112 MMHG | DIASTOLIC BLOOD PRESSURE: 53 MMHG

## 2017-02-27 RX ADMIN — DULOXETINE HYDROCHLORIDE SCH MG: 30 CAPSULE, DELAYED RELEASE ORAL at 19:59

## 2017-02-27 RX ADMIN — MAGNESIUM HYDROXIDE PRN ML: 400 SUSPENSION ORAL at 19:58

## 2017-02-27 RX ADMIN — Medication SCH ML: at 21:53

## 2017-02-27 RX ADMIN — DOCUSATE SODIUM SCH MG: 100 CAPSULE ORAL at 08:24

## 2017-02-27 RX ADMIN — RIVAROXABAN SCH MG: 20 TABLET, FILM COATED ORAL at 17:27

## 2017-02-27 RX ADMIN — OXYCODONE HYDROCHLORIDE AND ACETAMINOPHEN PRN EACH: 7.5; 325 TABLET ORAL at 13:01

## 2017-02-27 RX ADMIN — IBUPROFEN PRN MG: 600 TABLET ORAL at 08:30

## 2017-02-27 RX ADMIN — Medication SCH ML: at 15:43

## 2017-02-27 RX ADMIN — DULOXETINE HYDROCHLORIDE SCH MG: 30 CAPSULE, DELAYED RELEASE ORAL at 08:24

## 2017-02-27 RX ADMIN — DOCUSATE SODIUM SCH MG: 100 CAPSULE ORAL at 13:01

## 2017-02-27 RX ADMIN — DOCUSATE SODIUM AND SENNOSIDES SCH EA: 8.6; 5 TABLET, FILM COATED ORAL at 08:24

## 2017-02-27 RX ADMIN — Medication SCH ML: at 05:38

## 2017-02-27 RX ADMIN — OXYCODONE HYDROCHLORIDE AND ACETAMINOPHEN PRN EACH: 7.5; 325 TABLET ORAL at 03:32

## 2017-02-27 RX ADMIN — OXYCODONE HYDROCHLORIDE AND ACETAMINOPHEN PRN EACH: 7.5; 325 TABLET ORAL at 19:58

## 2017-02-27 RX ADMIN — DOCUSATE SODIUM SCH MG: 100 CAPSULE ORAL at 19:59

## 2017-02-27 RX ADMIN — IBUPROFEN PRN MG: 600 TABLET ORAL at 17:27

## 2017-02-27 NOTE — EMERGENCY ROOM REPORT
EMERGENCY ROOM CONSULTATION/PROCEDURE NOTE:  

02/25/2017

 

HISTORY OF THE PRESENT ILLNESS: 

The patient was trying to get into her bathroom around 9:40 this

morning and then she says her right knee gave out.  She tried to

grab onto the doorway and then fell and caused a fracture and

then sustained a deformity in her right ankle.  The family was

home and called an ambulance.  She was brought to the ER and I

was called with a report of an open fracture right ankle with no

pulse palpable.  I was actually in the operating room getting

ready to start a case and was down in the operating room within I

think 10 minutes.  

 

PERTINENT PAST MEDICAL HISTORY AND REVIEW OF SYSTEMS:  

The patient denies significant cardiac, pulmonary, GI endocrine

problems.  She states she does smoke and I instructed her that

she should stop to make it feel better.  

 

EXAM:      Exam shows an obviously open, dislocation of the right

ankle with the foot externally rotated about 90 degrees and the

laceration of the skin with the tibia medial malleolus sticking

out with approximately 10 or 12 cm.  We immediately set to doing

a relocation.  The patient was given conscious sedation by the ER

doctor but after I got  consent from the patient verbally.  I

used saline and Betadine to manually wash the end of the tibia. 

The fracture was of the medial malleolus as well as the ankle

joint itself.  Then did a single closure maneuver and was able to

line the ankle up nicely.  At that point, we were able to put a

pulse oximetry on the toe and show adequate waveform and 100%

oxygenation.    We then put a large compressive dressing with an

X style Orthoglass splint holding the ankle internally rotated

and in varus position.  The patient was then set up to go to the

operating room later this afternoon.

 

                                          

 

 

 

 

 

Job ID: [id number] 

Dictated Date: 02/25/2017 14:29:32 

Transcription Date: 02/26/2017 14:23:31/girish

## 2017-02-27 NOTE — DIAGNOSTIC IMAGING REPORT
PROCEDURE:

CT right lower extremity without contrast.



TECHNIQUE:

An axially acquired CT was obtained through the right lower

extremity without intravenous contrast. Coronal and sagittal

reformations were also performed.



INDICATION:

Status post right ankle fracture/dislocation with external

fixation performed.



FINDINGS:

There are air bubbles seen in the soft tissues and soft tissue

hemorrhage around the ankle as expected after the recent injury

and surgery.



External fixator hardware is seen through the posterior aspect

of the calcaneus and through the proximal to mid tibial shaft

and appears intact. There is an oblique fracture through the

distal fibula that has been reduced with no significant

displacement. The fracture line extends to the level of the

tibiofibular syndesmosis and is associated with a minimally

displaced 1 cm fragment. Anterior to the fibula at the joint

level, there is a 2.1 x 1.2 x 1 cm bone fragment with a donor

site suggestive of displacement of a fracture from the anterior

lateral aspect of the tibial plafond. The fragment lies anterior

to the fibula in this location. There is also a displaced

fracture inferior to the medial malleolus with a mildly

displaced fragment inferiorly. There is a mild small posterior

malleolar fracture with small fracture fragments seen with

minimal displacement. There are tiny bone fragments noted along

the anterior aspect of the talotibial joint, best seen on

sagittal images 20, 21, and 22. The joint alignment is

satisfactory. The subtalar joint appears normal. Some

degenerative changes in the intertarsal joints are noted with

satisfactory alignment seen and no fractures.



IMPRESSION:

1. Interval reduction of the the right ankle fracture

dislocation with an external fixator in place. Fractures in the

distal fibula, anterior inferior tibial plafond, posterior

malleolus, and medial malleolus are seen.



2. There are tiny bone fragments in the anterior aspect of the

talotibial joint. There is also a displaced bone fragment

measuring 1 x 2 cm from the anterior lateral aspect of the

tibial plafond displaced anterior to the distal fibula.



Dictated by:



Dictated on workstation # [id number]

## 2017-02-27 NOTE — PHYSICAL THERAPY DAILY NOTE
PT Daily Note-Current


Subjective


Patient is agreeable to participate with PT.





Pain





   Numeric Pain Scale:  5-Moderate Pain


   Location:  Right


   Location Body Site:  Ankle


   Pain Description:  Acute





Mental Status


Patient Orientation:  Normal For Age


Attachments:  Oxygen, Robertson Catheter





Transfers


              Functional [place] Measure


0=Not Assessed/NA   4=Minimal Assistance


1=Total Assistance   5=Supervision or Setup


2=Maximal Assistance   6=Modified [place]


3=Moderate Assistance   7=Complete IndependenceIRFPAI Quality Coding Scale











6 Independent with activity with or without an assistive device


 


5  Patient requires set up or clean up by helper.  Patient completes activity  

by  themselves


 


4 Supervision or touching assist (CGA). [place] provide cues , steadying assist


 


3 The helper provides less than half the effort to complete the activity


 


2 The helper provides more than half the effort to complete the activity


 


1 Dependent.  The helper does all the effort to complete an activity 


 


7 Patient refused to complete or attempt activity


 


9 The patient did not perform the activity before the current illness or injury


 


88 Not attempted due to Medical conditions or safety concerns








Transfers (B, C, W/C) (FIM):  5


Scootin


Supine to/from Sit:  5


Sit to/from Stand:  5


sit to stand transfers x 4 sets with elevated bed and FWW all SBA with patient 

complying with NWB right foot





Weight Bearing


Weight Bearing Restriction:  Non Weight Bearing


Location Restriction:  RT FOOT





Exercises


Supine Ex:  Straight leg raise, Hip abd/add


Supine Reps:  15


Seated Therapy Exercises:  Long arc quads


Seated Reps:  25





Assessment


Patient tolerated treatment well and declined up in recliner.  Patient right LE 

elevated with pillows.  Increase activity as tolerated by patient.





PT Long Term Goals


Long Term Goals


PT Long Term Goals Time Frame:  Mar 2, 2017


Transfers (B,C,W/C) (FIM):  4


Gait (FIM):  2


Gait distance (FIM):  1=up to 49 ft


Gait Assistive Device:  FWW


Wheelchair (FIM):  4


Wheelchair distance (FIM):  3=150 ft





PT Plan


Treatment/Plan


Treatment Plan:  Continue Plan of Care


Treatment Plan:  Bed Mobility, Education, Functional Activity Ta, Functional 

Strength, Gait, Safety, Therapeutic Exercise, Transfers


Treatment Duration:  Mar 2, 2017


Visits Per Week:  11





Time/GCodes


Time In:  830


Time Out:  853


Total Billed Treatment Time:  23


Total Billed Treatment


1 visit


EX 8 min


FA 15 min








SHAREE MARQUEZ PT 2017 09:42

## 2017-02-27 NOTE — DIAGNOSTIC IMAGING REPORT
EXAMINATION:

Three views of the right ankle.



INDICATION:

Right ankle fracture.



FINDINGS:

External fixators through the calcaneus and proximal to mid

tibial shaft are seen. There is satisfactory alignment of the

oblique fracture at the distal aspect of the fibular shaft.

There is also a fracture fragment projecting at the level of the

fibula which, in correlation with the CT scan, is a fractured

fragment from the anterior lateral corner of the tibial plafond.

A fracture fragment measuring 1 cm inferior to the medial

malleolus is again seen with slight displacement. There is

irregularity with suggestion of a posterior malleolar fracture

as well. There is still soft tissue swelling laterally.



The ankle mortise is normal in configuration.



IMPRESSION:

The distal tibial and fibular fractures are generally in good

alignment with a displaced fragment projecting anterior to the

distal fibula from the anterior lateral aspect of the tibial

plafond as confirmed on the CT scan.



Dictated by:



Dictated on workstation # [id number]

## 2017-02-27 NOTE — PHYSICAL THERAPY PROGRESS NOTE
Therapy Progress Note


Patient adamantly declined PT this a.m. due to right ankle pain and overall 

fatigue.  PT attempted to encourage/educate patient on importance of increasing 

mobility to prevent DVT's, and to ensure functional strength remained constant. 

However, patient continued to decline PT.  PT to attempt in a.m.


1 ref








SHAREE MARQUEZ PT Feb 27, 2017 13:18

## 2017-02-27 NOTE — PROGRESS NOTE (SOAP)
Subjective


Subjective/Events-last exam


JAYCE. Doing well. Ex Fix in place with minimal swelling. Denies numbness or 

tingling. Denies prior trauma to this ankle. Discussed treatment plan with her 

and she is agreeable to OR wedness with dr briseno. Questions were answered.





Objective


Exam





 Vital Signs








  Date Time  Temp Pulse Resp B/P Pulse Ox O2 Delivery O2 Flow Rate FiO2


 


17 23:37 98.7 107 17 112/55 95 Nasal Cannula 2.00 


 


17 21:00      Nasal Cannula 2.00 


 


17 20:00 99.8 110 20 117/55 92 Nasal Cannula 2.00 


 


17 16:55 98.1 95 20 99/53 91 Room Air  


 


17 15:59       2.00 


 


17 12:00 98.6 99 20 95/47 92 Room Air  


 


17 08:00 97.4 85 20 124/80 97 Room Air  














 I & O 


 


 17





 07:00


 


Intake Total 1415 ml


 


Output Total 1550 ml


 


Balance -135 ml





Capillary Refill : Less Than 3 SecondsLess Than 3 Seconds


General Appearance:   No Apparent Distress


Extremity:   Other (RLE in ex fixator, SILT all dermatomes, dressings CDI, BCR 

all toes, 2/4 DP, PT)





Results


Lab


 Laboratory Tests


17 04:45: 


Hematocrit 36, Hemoglobin 11.6





Assessment/Plan


Assessment/Plan


Assess & Plan/Chief Complaint


ASSESSMENT: S/p I&D with reduction and ex fix of R ankle fracture dislocation 





PLAN: 


discussed plan with dr briseno


wait on ASA


keep RLE elevated/iced


NWB RLE


NPO tuesday and MN for OR wednesday


Diagnosis/Problems:  





Clinical Quality Measures


DVT/VTE Risk/Contraindication:


Risk Factor Score Per Nursin


RFS Level Per Nursing on Admit:  4+=Very High








KARIME IZQUIERDO DO 2017 07:58

## 2017-02-28 VITALS — DIASTOLIC BLOOD PRESSURE: 61 MMHG | SYSTOLIC BLOOD PRESSURE: 129 MMHG

## 2017-02-28 VITALS — DIASTOLIC BLOOD PRESSURE: 82 MMHG | SYSTOLIC BLOOD PRESSURE: 133 MMHG

## 2017-02-28 VITALS — SYSTOLIC BLOOD PRESSURE: 123 MMHG | DIASTOLIC BLOOD PRESSURE: 61 MMHG

## 2017-02-28 RX ADMIN — IBUPROFEN PRN MG: 600 TABLET ORAL at 09:10

## 2017-02-28 RX ADMIN — DOCUSATE SODIUM AND SENNOSIDES SCH EA: 8.6; 5 TABLET, FILM COATED ORAL at 09:10

## 2017-02-28 RX ADMIN — MAGNESIUM HYDROXIDE PRN ML: 400 SUSPENSION ORAL at 09:10

## 2017-02-28 RX ADMIN — DOCUSATE SODIUM SCH MG: 100 CAPSULE ORAL at 12:57

## 2017-02-28 RX ADMIN — DOCUSATE SODIUM SCH MG: 100 CAPSULE ORAL at 09:10

## 2017-02-28 RX ADMIN — Medication SCH ML: at 22:00

## 2017-02-28 RX ADMIN — Medication SCH ML: at 05:08

## 2017-02-28 RX ADMIN — DOCUSATE SODIUM SCH MG: 100 CAPSULE ORAL at 20:34

## 2017-02-28 RX ADMIN — OXYCODONE HYDROCHLORIDE AND ACETAMINOPHEN PRN EACH: 7.5; 325 TABLET ORAL at 20:34

## 2017-02-28 RX ADMIN — Medication SCH ML: at 15:37

## 2017-02-28 RX ADMIN — OXYCODONE HYDROCHLORIDE AND ACETAMINOPHEN PRN EACH: 7.5; 325 TABLET ORAL at 04:14

## 2017-02-28 RX ADMIN — OXYCODONE HYDROCHLORIDE AND ACETAMINOPHEN PRN EACH: 7.5; 325 TABLET ORAL at 12:57

## 2017-02-28 RX ADMIN — RIVAROXABAN SCH MG: 20 TABLET, FILM COATED ORAL at 17:38

## 2017-02-28 RX ADMIN — DULOXETINE HYDROCHLORIDE SCH MG: 30 CAPSULE, DELAYED RELEASE ORAL at 09:10

## 2017-02-28 RX ADMIN — DULOXETINE HYDROCHLORIDE SCH MG: 30 CAPSULE, DELAYED RELEASE ORAL at 20:34

## 2017-02-28 NOTE — PODIATRY PROGRESS NOTE
Standard Progress Note


Progress Notes/Assess & Plan


Progress/Assessment & Plan


Pt seen at [facility] today.  She is stable pain controlled.  Complains that she hasn't 

had a bowel movement.  Denies F/C/N/V, denies SOB/CP.





RLE- N/V status intact, christian ROM intact to the digits, Ex-Fix stable, mild 

serous drainage from her medial ankle laceration.


Final Diagnosis


1. Trimalleolar Ankle Fracture RLE





-Plan for Removal of Ex Fix tomorrow with ORIF Right Ankle


-NPO after midnight


-Hold anticoags at this time








RUBA RODRÍGUEZ DPM Feb 28, 2017 11:30

## 2017-02-28 NOTE — PHYSICAL THERAPY PROGRESS NOTE
Therapy Progress Note


Patient adamantly declined PT due to fatigue and multiple BM's.  Patient to 

have surgery in a.m.


1 ref








SHAREE MARQUEZ PT Feb 28, 2017 14:06

## 2017-02-28 NOTE — PHYSICAL THERAPY DAILY NOTE
PT Daily Note-Current


Subjective


Patient agrees to PT.





Pain





   Numeric Pain Scale:  5-Moderate Pain


   Location:  Right


   Location Body Site:  Ankle


   Pain Description:  Throbbing





Mental Status


Patient Orientation:  Normal For Age





Transfers


              Functional [place] Measure


0=Not Assessed/NA   4=Minimal Assistance


1=Total Assistance   5=Supervision or Setup


2=Maximal Assistance   6=Modified [place]


3=Moderate Assistance   7=Complete IndependenceIRFPAI Quality Coding Scale











6 Independent with activity with or without an assistive device


 


5  Patient requires set up or clean up by helper.  Patient completes activity  

by  themselves


 


4 Supervision or touching assist (CGA). [place] provide cues , steadying assist


 


3 The helper provides less than half the effort to complete the activity


 


2 The helper provides more than half the effort to complete the activity


 


1 Dependent.  The helper does all the effort to complete an activity 


 


7 Patient refused to complete or attempt activity


 


9 The patient did not perform the activity before the current illness or injury


 


88 Not attempted due to Medical conditions or safety concerns








Transfers (B, C, W/C) (FIM):  5


Scootin


Rollin


Supine to/from Sit:  5


Sit to/from Stand:  5


Bed to/from Chair:  5


sit to stand transfer training from elevated bed x 5 sets SBA to FWW





Weight Bearing


Weight Bearing Restriction:  Non Weight Bearing


Location Restriction:  RT FOOT





Exercises


Supine Ex:  Heel Slides, Straight leg raise


Supine Reps:  15


Seated Therapy Exercises:  Long arc quads


Seated Reps:  25





Assessment


Patient tolerates minimal activity.  Patient to have surgery tomorrow.





PT Long Term Goals


Long Term Goals


PT Long Term Goals Time Frame:  Mar 2, 2017


Transfers (B,C,W/C) (FIM):  4


Gait (FIM):  2


Gait distance (FIM):  1=up to 49 ft


Gait Assistive Device:  FWW


Wheelchair (FIM):  4


Wheelchair distance (FIM):  3=150 ft





PT Plan


Treatment/Plan


Treatment Plan:  Continue Plan of Care


Treatment Plan:  Bed Mobility, Education, Functional Activity Ta, Functional 

Strength, Gait, Safety, Therapeutic Exercise, Transfers


Treatment Duration:  Mar 2, 2017


Visits Per Week:  11





Time/GCodes


Time In:  920


Time Out:  943


Total Billed Treatment Time:  23


Total Billed Treatment


1 visit


EX 10 min


FA 13 min








SHAREE MARQUEZ PT 2017 09:50

## 2017-02-28 NOTE — PROGRESS NOTE-HOSPITALIST
Standard Progress Note


Progress Notes/Assess & Plan


Date Seen


2/28/17


Diagnosis


Open ankle fracture


Assess & Plan/Chief Complaint


The patient reports that she is having a fair amount of pain today.  It is 

planned to have a second operation tomorrow for internal fixation.  She will 

then have a period of nonweightbearing.  It Would be my guess that she will 

require placement until she has been able to begin weightbearing on the right 

leg.  At that time she would be a candidate for the rehabilitation program.  

Ultimately her plans are to take Residence with her son as she has limited 

mobility from previous left ankle fracture and plating plus bilateral total 

knee replacement.  She reports that she has had the sensation of the right knee 

buckling.  It would be my interpretation this represents limited right 

quadriceps strength and this could be targeted in her rehabilitation.





Physical exam: She is alert and oriented.  Lungs are clear to auscultation.  CV 

is regular without murmur.  Right lower extremity is warm without erythema





Impression: Fracture dislocation of right ankle.  2.external fixation at this 

time.  3.planned second surgery tomorrow for internal fixation.





Plan: Await tomorrow's surgery prior to discharge planning


Labs


Laboratory Tests


2/27/17 04:45








2/28/17 04:21

















RAYA WEBSTER MD Feb 28, 2017 11:33

## 2017-03-01 VITALS — DIASTOLIC BLOOD PRESSURE: 68 MMHG | SYSTOLIC BLOOD PRESSURE: 136 MMHG

## 2017-03-01 VITALS — DIASTOLIC BLOOD PRESSURE: 73 MMHG | SYSTOLIC BLOOD PRESSURE: 128 MMHG

## 2017-03-01 VITALS — SYSTOLIC BLOOD PRESSURE: 136 MMHG | DIASTOLIC BLOOD PRESSURE: 81 MMHG

## 2017-03-01 VITALS — DIASTOLIC BLOOD PRESSURE: 71 MMHG | SYSTOLIC BLOOD PRESSURE: 131 MMHG

## 2017-03-01 PROCEDURE — 0QSG04Z REPOSITION RIGHT TIBIA WITH INTERNAL FIXATION DEVICE, OPEN APPROACH: ICD-10-PCS | Performed by: PODIATRIST

## 2017-03-01 PROCEDURE — 0QPG35Z REMOVAL OF EXTERNAL FIXATION DEVICE FROM RIGHT TIBIA, PERCUTANEOUS APPROACH: ICD-10-PCS | Performed by: PODIATRIST

## 2017-03-01 RX ADMIN — SODIUM CHLORIDE SCH MLS/HR: 900 INJECTION INTRAVENOUS at 15:08

## 2017-03-01 RX ADMIN — DULOXETINE HYDROCHLORIDE SCH MG: 30 CAPSULE, DELAYED RELEASE ORAL at 20:12

## 2017-03-01 RX ADMIN — DOCUSATE SODIUM SCH MG: 100 CAPSULE ORAL at 15:08

## 2017-03-01 RX ADMIN — Medication SCH ML: at 21:31

## 2017-03-01 RX ADMIN — DOCUSATE SODIUM SCH MG: 100 CAPSULE ORAL at 20:12

## 2017-03-01 RX ADMIN — DOCUSATE SODIUM AND SENNOSIDES SCH EA: 8.6; 5 TABLET, FILM COATED ORAL at 08:02

## 2017-03-01 RX ADMIN — OXYCODONE HYDROCHLORIDE AND ACETAMINOPHEN PRN EACH: 7.5; 325 TABLET ORAL at 18:41

## 2017-03-01 RX ADMIN — SODIUM CHLORIDE, SODIUM LACTATE, POTASSIUM CHLORIDE, AND CALCIUM CHLORIDE PRN MLS/HR: 600; 310; 30; 20 INJECTION, SOLUTION INTRAVENOUS at 07:30

## 2017-03-01 RX ADMIN — DOCUSATE SODIUM SCH MG: 100 CAPSULE ORAL at 08:02

## 2017-03-01 RX ADMIN — DULOXETINE HYDROCHLORIDE SCH MG: 30 CAPSULE, DELAYED RELEASE ORAL at 08:02

## 2017-03-01 RX ADMIN — Medication SCH ML: at 06:33

## 2017-03-01 RX ADMIN — SODIUM CHLORIDE SCH MLS/HR: 900 INJECTION INTRAVENOUS at 21:31

## 2017-03-01 RX ADMIN — RIVAROXABAN SCH MG: 20 TABLET, FILM COATED ORAL at 17:15

## 2017-03-01 RX ADMIN — SODIUM CHLORIDE, SODIUM LACTATE, POTASSIUM CHLORIDE, AND CALCIUM CHLORIDE PRN MLS/HR: 600; 310; 30; 20 INJECTION, SOLUTION INTRAVENOUS at 09:34

## 2017-03-01 RX ADMIN — OXYCODONE HYDROCHLORIDE AND ACETAMINOPHEN PRN EACH: 7.5; 325 TABLET ORAL at 05:44

## 2017-03-01 RX ADMIN — HYDROMORPHONE HYDROCHLORIDE PRN MG: 2 INJECTION, SOLUTION INTRAMUSCULAR; INTRAVENOUS; SUBCUTANEOUS at 20:12

## 2017-03-01 RX ADMIN — Medication SCH ML: at 15:08

## 2017-03-01 NOTE — PROGRESS NOTE-HOSPITALIST
Standard Progress Note


Progress Notes/Assess & Plan


Date Seen


3/1/17


Diagnosis


Open ankle fracture


Assess & Plan/Chief Complaint


The patient completed her internal fixation of her ankle fracture/dislocation 

this morning.  Dr. Young allowed yesterday that she will be ready for 

discharge in the morning.  The patient had allowed that she intended to go home 

in the care of her son.  It was again discussed that we felt that this would be 

selling herself short.  She will not be able to do weightbearing for a period 

of time on the right leg and would be better suited for short-term nursing home 

placement and then the consideration of inpatient rehabilitation.  Social 

services will work with her on this.





Physical exam: Lungs are clear to auscultation.  CV is regular without murmur.  

Ankle is now in dressing without external fixation.





Impression: Open compound fracture/dislocation right ankle.  Plan: Pursue 

options prior to her ability to rehabilitation


Labs


Laboratory Tests


2/28/17 04:21

















RAYA WEBSTER MD Mar 1, 2017 13:40

## 2017-03-01 NOTE — DIAGNOSTIC IMAGING REPORT
Intraoperative views of the ankle, 2 views obtained.



INDICATION: ORIF.

Also fluoroscopy was provided to the OR with 91 seconds provided.



FINDINGS:

There is removal of calcaneus external fixation sharon. There is

placement of plate and screw fixation into the distal fibula and

lateral aspect of the distal tibia. The ankle mortise is normal

in configuration. There is a fracture fragment with slight

displacement inferior to the medial malleolus.



IMPRESSION: Open reduction internal fixation with plate and

screws in the distal fibula and distal lateral aspect of the

tibia in good alignment.



Dictated by:



Dictated on workstation # [id number]

## 2017-03-01 NOTE — PHYSICAL THERAPY PROGRESS NOTE
Therapy Progress Note


Patient is back from having ankle surgery.  We do not have new orders to 

continue therapy yet.  I talked to nurse Carol and she says that they are not 

even sure about her weight bearing status yet plus she feels the patient is not 

ready to try to work with therapy yet.  She is scheduled to be discharged to a 

nursing home tomorrow.  Will check back in the morning.








SREE SALTER PT Mar 1, 2017 15:18

## 2017-03-02 VITALS — SYSTOLIC BLOOD PRESSURE: 118 MMHG | DIASTOLIC BLOOD PRESSURE: 56 MMHG

## 2017-03-02 VITALS — SYSTOLIC BLOOD PRESSURE: 127 MMHG | DIASTOLIC BLOOD PRESSURE: 58 MMHG

## 2017-03-02 VITALS — DIASTOLIC BLOOD PRESSURE: 75 MMHG | SYSTOLIC BLOOD PRESSURE: 130 MMHG

## 2017-03-02 RX ADMIN — Medication SCH ML: at 06:18

## 2017-03-02 RX ADMIN — OXYCODONE HYDROCHLORIDE AND ACETAMINOPHEN PRN EACH: 7.5; 325 TABLET ORAL at 02:16

## 2017-03-02 RX ADMIN — DOCUSATE SODIUM AND SENNOSIDES SCH EA: 8.6; 5 TABLET, FILM COATED ORAL at 10:00

## 2017-03-02 RX ADMIN — DOCUSATE SODIUM SCH MG: 100 CAPSULE ORAL at 10:01

## 2017-03-02 RX ADMIN — HYDROMORPHONE HYDROCHLORIDE PRN MG: 2 INJECTION, SOLUTION INTRAMUSCULAR; INTRAVENOUS; SUBCUTANEOUS at 00:18

## 2017-03-02 RX ADMIN — DULOXETINE HYDROCHLORIDE SCH MG: 30 CAPSULE, DELAYED RELEASE ORAL at 10:00

## 2017-03-02 RX ADMIN — OXYCODONE HYDROCHLORIDE AND ACETAMINOPHEN PRN EACH: 7.5; 325 TABLET ORAL at 10:01

## 2017-03-02 NOTE — DISCHARGE INSTRUCTIONS
Discharge Instructions


Discharge Medications


New, Converted or Re-Newed RX:  RX on Chart


New Medications:  


Lactulose (Lactulose) 20 Gm/30 Ml Solution


20 GM PO BID Days 30 EA


Nystatin (Nystatin) 1 Each Powder.ea.


1 EACH MC BID Days 30 UNIT


Nystatin (Nystatin) 15 Gm Cream..g.


15 GM TP BID Days 30 TUBE


Oxycodone HCl/Acetaminophen (Oxycodon-Acetaminophen 7.5-325) 1 Each Tablet


1-2 EACH PO Q6HR PRN SEVERE PAIN #120 TAB


Rivaroxaban (Xarelto) 20 Mg Tablet


20 MG PO DAILY@1700 Days 30 TAB


Sennosides/Docusate Sodium (Senna-Time S Tablet) 1 Each Tablet


1 EA PO DAILY Days 30 TAB


 


Continued Medications:  


Albuterol Sulfate (Albuterol Sulfate) 1.25 Mg/3 Ml Vial.neb


3 ML IH EVERY 4-6 HOURS PRN SHORTNESS OF BREATH


Aspirin (Aspirin EC) 81 Mg Tablet.dr


81 MG PO HS TAB


Budesonide/Formoterol Fumarate (Symbicort 160-4.5 Mcg Inhaler) 10.2 Gm 

Hfa.aer.ad


2 PUFF IH BID


Desmopressin Acetate (Desmopressin Acetate) 0.2 Mg Tablet


0.6 MG PO HS TAKES 3 (0.2 MG) TABLETS


Doxepin HCl (Doxepin HCl) 50 Mg Capsule


50 MG PO HS


Duloxetine HCl (Duloxetine HCl) 30 Mg Capsule.dr


30 MG PO DAILY TAKES ALONG WITH DULOXETINE 60MG DAILY


Duloxetine HCl (Duloxetine HCl) 60 Mg Capsule.dr


60 MG PO DAILY TAKES ALONG WITH DULOXETINE 30MG DAILY #120


Imipramine HCl (Imipramine HCl) 25 Mg Tablet


25 MG PO HS


Levothyroxine Sodium (Levothyroxine Sodium) 100 Mcg Tablet


100 MCG PO DAILY


Naproxen (Naproxen) 500 Mg Tablet


500 MG PO BID


Oxybutynin Chloride (Oxybutynin Chloride ER) 10 Mg Tab.er.24


10 MG PO HS


Simvastatin (Simvastatin) 10 Mg Tablet


10 MG PO HS


Tiotropium Bromide (Spiriva) 1 Inh Aerp


2 PUFF IH DAILY


Topiramate (Topiramate) 100 Mg Tablet


100 MG PO BID


Tramadol HCl (Tramadol HCl) 50 Mg Tablet


50 MG PO BID #60 (This prescription has been renewed)








Patient Instructions


Goal/Follow Up Appt:  


Dr Young 2 weeks


Dr Piper 2 weeks


Patient Instructions:  


Complete non-weight bearing right ankle


Take Xarelto daily for prevention of DVT





Activity & Diet


Discharge Diet:  No Restrictions


Activity as Tolerated:  Yes





Copy


Copies To 1:   JAYLENE PIPER MD, MINDI DO Mar 2, 2017 10:47

## 2017-03-02 NOTE — DISCHARGE SUMMARY-HOSPITALIST
Diagnosis/Chief Complaint


Date of Admission


2017 at 16:45


Date of Discharge





Admission Diagnosis


Open ankle fracture





Discharge Diagnosis


1. Open ankle fracture


- POD #5 and POD # 1


- Pain per ortho


- On Xarelto for DVT Px





2. Hypothyroidism


- Continue home Synthroid





3. Migraines


- Continue Topamax in AM





4. Asthma, no acute exacerbation


- Continue Albuterol nebs as needed


- Continue Symbicort 


- Continue O2 at NH since she is requiring 4 liters here








Reason Hospital Visit/Course


Fell shortly before arrival and had open fracture of ankle.





Notes from 3/2/2017:





RN Review:


Pt had po pain meds yesterday. Pt taking Percocet 7.5 two pills q4-6 hours. Pt 

has allergies to Codeine.


Pt has developing yeast-infection symptoms





SW Review:


Pt has a non-weight- bearing status, and placement at [facility] is being 

arranged.





Patient Interview:


Pt states that she has pains.


Dr. Montoya informs pt that catheter will DC and pt will receive cream and 

powder for yeast symptoms.


Pt confirms that Dr. Moore is PCP.


Pt states that she has not had BM since surgery.


Pt states that she does not use home O2.


Physical exam stable.





Vital signs stable, pleasant, up in chair but in pain


Regular rate and rhythm, clear to all sedation bilaterally


No edema





Plan:


Lactulose


Cream and powder for developing yeast infection has been ordered


DC catheter


Percocet 7.5 two pills q4-6 pills


4L O2


Update Dr. Moore


Two-week follow up with Dr. Hector NAVA to [facility] today


Outpatient PT orders


Xarelto for DVT Px





Scribed by Remigio Garrett under the direct supervision of Dr. Montoya.





Hospital course: Patient a lengthy hospital course that required anfracture 

management by first reduction in the ER been surgical repair by orthopedic 

surgery then a repeat surgery one day prior to discharge by Dr. Young that 

required oxygen supplementation along with home medication and pain control due 

to the severity of the fracture and inability to weight-bear.  Due to this fact 

she was placed in a nursing facility to help with ADLs with hopes of after non-

weightbearing status has resolved she will be able to regain function to be 

able to live independently with her son.  I have updated Dr. Moore her 

primary care provider who will monitor her at the nursing home while maintained 

on acute pain medication along with bowel regimen in addition to Xarelto for 

DVT prophylaxis.





Discharge Summary


Discharge Physical Examination


Allergies:  


Coded Allergies:  


     codeine (Verified  Allergy, Mild, 17)


Vitals & I&Os





Vital Signs








  Date Time  Temp Pulse Resp B/P Pulse Ox O2 Delivery O2 Flow Rate FiO2


 


3/2/17 11:21 98.7       


 


3/2/17 08:18      Nasal Cannula 4.00 


 


3/2/17 08:00  114 20 127/58    


 


3/2/17 04:30     92   











Discharge


Home Medications:





 Active Scripts


 Active


Nystatin 15 Gm Cream..g. 15 Gm TP BID 30 Days


Nystatin 1 Each Powder.ea. 1 Each MC BID 30 Days


Lactulose 20 Gm/30 Ml Solution 20 Gm PO BID 30 Days


Senna-Time S Tablet (Sennosides/Docusate Sodium) 1 Each Tablet 1 Ea PO DAILY 30 

Days


Oxycodon-Acetaminophen 7.5-325 (Oxycodone HCl/Acetaminophen) 1 Each Tablet 1-2 

Each PO Q6HR PRN


Xarelto (Rivaroxaban) 20 Mg Tablet 20 Mg PO DAILY@1700 30 Days


Tramadol HCl 50 Mg Tablet 50 Mg PO BID


 Reported


Aspirin EC (Aspirin) 81 Mg Tablet.dr 81 Mg PO HS


Duloxetine HCl 60 Mg Capsule.dr 60 Mg PO DAILY


     TAKES ALONG WITH DULOXETINE 30MG DAILY


     


Duloxetine HCl 30 Mg Capsule.dr 30 Mg PO DAILY


     TAKES ALONG WITH DULOXETINE 60MG DAILY


Albuterol Sulfate 1.25 Mg/3 Ml Vial.neb 3 Ml IH EVERY 4-6 HOURS PRN


Spiriva (Tiotropium Bromide) 1 Inh Aerp 2 Puff IH DAILY


Symbicort 160-4.5 Mcg Inhaler (Budesonide/Formoterol Fumarate) 10.2 Gm 

Hfa.aer.ad 2 Puff IH BID


Simvastatin 10 Mg Tablet 10 Mg PO HS


Levothyroxine Sodium 100 Mcg Tablet 100 Mcg PO DAILY


Doxepin HCl 50 Mg Capsule 50 Mg PO HS


Naproxen 500 Mg Tablet 500 Mg PO BID


Topiramate 100 Mg Tablet 100 Mg PO BID


Oxybutynin Chloride ER (Oxybutynin Chloride) 10 Mg Tab.er.24 10 Mg PO HS


Imipramine HCl 25 Mg Tablet 25 Mg PO HS


Desmopressin Acetate 0.2 Mg Tablet 0.6 Mg PO HS


     TAKES 3 (0.2 MG) TABLETS





Instructions to patient/family


Please see electonic discharge instructions given to patient.





Clinical Quality Measures


DVT/VTE Risk/Contraindication:


Risk Factor Score Per Nursin


RFS Level Per Nursing on Admit:  4+=Very High








NACHO MONTOYA DO Mar 2, 2017 10:38

## 2017-03-02 NOTE — PODIATRY PROGRESS NOTE
Standard Progress Note


Progress Notes/Assess & Plan


Progress/Assessment & Plan


Pt seen at BS today.  She is stable pain controlled.  Denies F/C/N/V, denies SOB

/CP.





RLE- N/V status intact, christian ROM intact to the digits,  Splint C/C/I, calves 

supple nontender


Final Diagnosis


POD #1 Removal of Ex-Fix/ORIF Right Ankle





-NWB RLE


-Okay for D/C to Rehab


-Follow up in 2 weeks


-Cont DVT prophylaxis on D/C for 14 days.








RUBA RODRÍGUEZ DPM Mar 2, 2017 10:42

## 2017-03-02 NOTE — PHYSICAL THERAPY PROGRESS NOTE
Therapy Progress Note


No current PT orders.  Patient to transfer to NH on this date for continued 

care.  Patient is strict NWB right ankle.








SHAREE MARQUEZ PT Mar 2, 2017 11:04

## 2017-03-30 NOTE — OPERATIVE REPORT
PROCEDURE PHYSICIAN:   RUBA RODRÍGUEZ

 

DATE OF PROCEDURE:  03/01/2017

 

SURGEON:  

Dr. Rodríguez 

 

ASSISTANT:  

None.  

 

PREOPERATIVE DIAGNOSES:

1.   Retained external fixator of the right ankle. 

2.   Open trimalleolar ankle fracture of the right lower

extremity. 

 

POSTOPERATIVE DIAGNOSES: 

1.   Retained external fixator of the right ankle. 

2.   Trimalleolar ankle fracture of the right lower extremity. 

 

PROCEDURE PERFORMED:

1.   Removal of external fixator. 

2.   Open reduction internal fixation of trimalleolar ankle

fracture, right lower extremity with syndesmotic stabilization. 

3.   Complicated wound closure of open fracture, right ankle.  

 

ANESTHESIA:

General anesthesia. 

 

HEMOSTASIS:

Pneumatic thigh tourniquet at 300 mmHg. 

 

BLOOD LOSS:

50 mL 

 

MATERIALS USED: 

1.   Arthrex plates and screws.  

2.   3-0 Vicryl.  

3.   3-0 nylon. 

 

INTRAOPERATIVE INJECTABLES: 

20 mL of half percent Marcaine plain. 

 

COMPLICATIONS:

None. 

 

INDICATIONS FOR THE PROCEDURE:

The patient Angelique Rosario is a 61-year-old female with a history of

open ankle fracture to her right lower extremity.  She has

underwent external fixation and washout of the ankle fracture and

is now requiring open reduction internal fixation. The patient

has been made aware of the risks and benefits of the procedure as

well as alternatives to undergoing it and signed consent prior to

being taken back to the OR. 

 

DESCRIPTION OF THE PROCEDURE:

Under mild sedation, the patient was brought into the OR and

placed on the operating table in the supine position. Following

administration of general anesthesia, a pneumatic thigh

tourniquet was placed to the right lower extremity and the right

lower extremity was scrubbed, prepped and draped in an aseptic

manner.  Appropriate timeout was performed. The right lower

extremity was identified as the surgical site. Next, the external

fixator was removed and passed from the surgical field and all of

the pin sites were well debrided using a curette and the area

appropriately irrigated. The right lower extremity was again

scrubbed and prepped and redraped aseptically.  Sutures were then

removed from the area of the open ankle fracture of the medial

aspect of the ankle. The skin was retracted and the wound was

flushed with 3 liters of saline and pulse lavaged. Once the area

of the open fracture was appropriately debrided, the skin to the

medial aspect of the ankle was surgically closed using 3-0 nylon

with wound edges well everted. Next, an approximately 8 cm

incision was made to the lateral aspect of the ankle. The fibular

fracture was identified and reduced.  It was temporally fixated

with fracture clamps. Next, a trans-interfragmentary screw was

placed and adequate reduction of the fibular fracture was noted.

 The lateral plate was then fixated with combination of locking

and nonlocking screws. There was adequate position of the distal

fibula and adequate reduction that was noted under fluoroscopic

guidance. There was also an anterolateral distal tibial fracture

that made up to a large portion of the articular surface of the

anterior distal tibia. This was also reduced under fluoroscopy

and was temporally fixated with K wires.  Once the reduction was

noted to be adequate under fluoroscopy, a plate was then fixated

to the anterior lateral tibia and with a combination of locking

and nonlocking screws., there was good compression across the

fracture fragment and adequate position was noted.  Stress

abductory test was then placed across the ankle and there was

increased medial clear space as well as diastases of the

syndesmosis, thus indication for a syndesmotic stabilization was

deemed necessary. The syndesmosis was reduced with a fracture

clamp and a 3.5 mm 4 cortical screw was placed across the

syndesmosis.  Again, the clamp was removed and a stress abduction

test was performed and adequate reduction of the syndesmosis was

noted. The wound was flushed with copious amounts sterile saline.

Deep tissues were approximated and closed with 3-0 Vicryl.

Subcutaneous tissues were reapproximated 3-0 Vicryl and the skin

was reapproximated and well everted using 3-0 nylon. 20 mL half

percent Marcaine plain were injected in the ankle and the ankle

was dressed with dry sterile dressing, consisting of 4 x 4's, web

roll, Ace wrap and a posterior splint with splinting materials

and Ace wrap. The patient tolerated the procedure and anesthesia

well. She was transferred from OR to recovery with vital signs

stable, neurovascular status intact to the right lower extremity.

 

 

 

 

Job ID: [id number]

Dictated Date: 03/29/2017 16:52:00 

Transcription Date: 03/30/2017 09:10:59 / parisa

## 2017-05-01 ENCOUNTER — HOSPITAL ENCOUNTER (OUTPATIENT)
Dept: HOSPITAL 75 - RAD | Age: 62
End: 2017-05-01
Attending: SURGERY
Payer: MEDICAID

## 2017-05-01 DIAGNOSIS — L97.312: Primary | ICD-10-CM

## 2017-05-01 DIAGNOSIS — Z98.890: ICD-10-CM

## 2017-05-01 LAB
ALBUMIN SERPL-MCNC: 3.9 G/DL (ref 3.2–4.5)
ALT SERPL-CCNC: 29 U/L (ref 0–55)
ANION GAP SERPL CALC-SCNC: 10 MMOL/L (ref 5–14)
AST SERPL-CCNC: 33 U/L (ref 5–34)
BASOPHILS # BLD AUTO: 0.1 10^3/UL (ref 0–0.1)
BASOPHILS NFR BLD AUTO: 1 % (ref 0–10)
BILIRUB SERPL-MCNC: 0.4 MG/DL (ref 0.1–1)
BUN SERPL-MCNC: 16 MG/DL (ref 7–18)
BUN/CREAT SERPL: 19
CALCIUM SERPL-MCNC: 8.9 MG/DL (ref 8.5–10.1)
CHLORIDE SERPL-SCNC: 105 MMOL/L (ref 98–107)
CO2 SERPL-SCNC: 23 MMOL/L (ref 21–32)
CREAT SERPL-MCNC: 0.85 MG/DL (ref 0.6–1.3)
EOSINOPHIL # BLD AUTO: 0.2 10^3/UL (ref 0–0.3)
EOSINOPHIL NFR BLD AUTO: 4 % (ref 0–10)
ERYTHROCYTE [DISTWIDTH] IN BLOOD BY AUTOMATED COUNT: 14.3 % (ref 10–14.5)
GFR SERPLBLD BASED ON 1.73 SQ M-ARVRAT: > 60 ML/MIN
GLUCOSE SERPL-MCNC: 100 MG/DL (ref 70–105)
LYMPHOCYTES # BLD AUTO: 1.3 X 10^3 (ref 1–4)
LYMPHOCYTES NFR BLD AUTO: 23 % (ref 12–44)
MCH RBC QN AUTO: 27 PG (ref 25–34)
MCHC RBC AUTO-ENTMCNC: 31 G/DL (ref 32–36)
MCV RBC AUTO: 86 FL (ref 80–99)
MONOCYTES # BLD AUTO: 0.4 X 10^3 (ref 0–1)
MONOCYTES NFR BLD AUTO: 7 % (ref 0–12)
NEUTROPHILS # BLD AUTO: 3.7 X 10^3 (ref 1.8–7.8)
NEUTROPHILS NFR BLD AUTO: 66 % (ref 42–75)
PLATELET # BLD: 244 10^3/UL (ref 130–400)
PMV BLD AUTO: 8.9 FL (ref 7.4–10.4)
POTASSIUM SERPL-SCNC: 4 MMOL/L (ref 3.6–5)
PROT SERPL-MCNC: 7.1 G/DL (ref 6.4–8.2)
RBC # BLD AUTO: 4.4 10^6/UL (ref 4.35–5.85)
SODIUM SERPL-SCNC: 138 MMOL/L (ref 135–145)
WBC # BLD AUTO: 5.7 10^3/UL (ref 4.3–11)

## 2017-05-01 PROCEDURE — 73610 X-RAY EXAM OF ANKLE: CPT

## 2017-05-01 PROCEDURE — 36415 COLL VENOUS BLD VENIPUNCTURE: CPT

## 2017-05-01 PROCEDURE — 80053 COMPREHEN METABOLIC PANEL: CPT

## 2017-05-01 PROCEDURE — 85025 COMPLETE CBC W/AUTO DIFF WBC: CPT

## 2017-05-01 NOTE — DIAGNOSTIC IMAGING REPORT
Three views of the right ankle.



INDICATION: Right ankle nonpressure ulcer.



COMPARISON: 2/27/2017.



FINDINGS:

There is internal fixation hardware seen along the distal fibula

and from the lateral aspect of the distal tibia. Mildly displaced

fracture of the medial malleolus is seen. There is no fixating

hardware at this level. The ankle mortise configuration however

is preserved. There is no obvious bone destruction or aggressive

erosion identified to suggest osteomyelitis. Slight lucency

around the hardware along the distal fibula could be related to

remodeling of bone. There is a calcaneal spur seen.



IMPRESSION:

Internal fixation hardware and mildly displaced fracture of the

medial malleolus as described. Slight lucency in the distal

fibula is likely related to remodeling with no definite

aggressive bone erosion seen. Correlate clinically and with

followup exams.



Dictated by: 



  Dictated on workstation # [id number]

## 2017-05-04 ENCOUNTER — HOSPITAL ENCOUNTER (OUTPATIENT)
Dept: HOSPITAL 75 - SDC | Age: 62
LOS: 90 days | Discharge: HOME | End: 2017-08-02
Attending: SURGERY
Payer: MEDICAID

## 2017-05-04 VITALS — WEIGHT: 293 LBS | BODY MASS INDEX: 53.92 KG/M2 | HEIGHT: 62 IN

## 2017-05-04 VITALS — DIASTOLIC BLOOD PRESSURE: 88 MMHG | SYSTOLIC BLOOD PRESSURE: 141 MMHG

## 2017-05-04 DIAGNOSIS — B96.5: ICD-10-CM

## 2017-05-04 DIAGNOSIS — T81.31XA: ICD-10-CM

## 2017-05-04 DIAGNOSIS — L97.312: Primary | ICD-10-CM

## 2017-05-04 DIAGNOSIS — T65.222A: ICD-10-CM

## 2017-05-04 DIAGNOSIS — I89.0: ICD-10-CM

## 2017-05-04 DIAGNOSIS — E66.01: ICD-10-CM

## 2017-05-04 PROCEDURE — 96365 THER/PROPH/DIAG IV INF INIT: CPT

## 2017-05-04 PROCEDURE — 96366 THER/PROPH/DIAG IV INF ADDON: CPT

## 2017-05-04 PROCEDURE — 76937 US GUIDE VASCULAR ACCESS: CPT

## 2017-05-04 PROCEDURE — 71010: CPT

## 2017-05-04 PROCEDURE — 36569 INSJ PICC 5 YR+ W/O IMAGING: CPT

## 2017-05-04 NOTE — DIAGNOSTIC IMAGING REPORT
INDICATION: PICC line placement.



Frontal chest obtained at 03:07 p.m. and compared to 02/25/2017.



Heart is borderline in size. There is no acute infiltrate,

pneumothorax, or pleural fluid. There is a PICC line in place

from the left arm with tip overlying the mid SVC.



IMPRESSION: Borderline heart size. No acute infiltrate. PICC line

in place from the left arm with tip overlying mid SVC.



Dictated by: 



  Dictated on workstation # [id number]

## 2017-05-08 ENCOUNTER — HOSPITAL ENCOUNTER (OUTPATIENT)
Dept: HOSPITAL 75 - WOUNDCARE | Age: 62
LOS: 11 days | Discharge: HOME | End: 2017-05-19
Attending: SURGERY
Payer: MEDICAID

## 2017-05-08 DIAGNOSIS — L97.312: Primary | ICD-10-CM

## 2017-05-08 DIAGNOSIS — I89.0: ICD-10-CM

## 2017-05-08 DIAGNOSIS — T65.222A: ICD-10-CM

## 2017-05-08 DIAGNOSIS — T81.31XA: ICD-10-CM

## 2017-05-08 DIAGNOSIS — E66.01: ICD-10-CM

## 2017-05-08 PROCEDURE — 87075 CULTR BACTERIA EXCEPT BLOOD: CPT

## 2017-05-08 PROCEDURE — 87205 SMEAR GRAM STAIN: CPT

## 2017-05-08 PROCEDURE — 11042 DBRDMT SUBQ TIS 1ST 20SQCM/<: CPT

## 2017-05-08 PROCEDURE — 87070 CULTURE OTHR SPECIMN AEROBIC: CPT

## 2017-05-08 PROCEDURE — 87186 SC STD MICRODIL/AGAR DIL: CPT

## 2017-05-11 ENCOUNTER — HOSPITAL ENCOUNTER (INPATIENT)
Dept: HOSPITAL 75 - 4TH | Age: 62
LOS: 5 days | Discharge: NURSING, REHAB OR LONG-TERM CARE | DRG: 494 | End: 2017-05-16
Attending: INTERNAL MEDICINE | Admitting: INTERNAL MEDICINE
Payer: MEDICAID

## 2017-05-11 VITALS — DIASTOLIC BLOOD PRESSURE: 78 MMHG | SYSTOLIC BLOOD PRESSURE: 112 MMHG

## 2017-05-11 VITALS — WEIGHT: 273.01 LBS | HEIGHT: 64 IN | BODY MASS INDEX: 46.61 KG/M2

## 2017-05-11 VITALS — SYSTOLIC BLOOD PRESSURE: 180 MMHG | DIASTOLIC BLOOD PRESSURE: 76 MMHG

## 2017-05-11 VITALS — SYSTOLIC BLOOD PRESSURE: 120 MMHG | DIASTOLIC BLOOD PRESSURE: 75 MMHG

## 2017-05-11 DIAGNOSIS — G89.21: ICD-10-CM

## 2017-05-11 DIAGNOSIS — E03.9: ICD-10-CM

## 2017-05-11 DIAGNOSIS — M14.671: Primary | ICD-10-CM

## 2017-05-11 DIAGNOSIS — F17.210: ICD-10-CM

## 2017-05-11 DIAGNOSIS — N18.9: ICD-10-CM

## 2017-05-11 DIAGNOSIS — J44.9: ICD-10-CM

## 2017-05-11 DIAGNOSIS — G43.909: ICD-10-CM

## 2017-05-11 LAB
ALBUMIN SERPL-MCNC: 3.6 G/DL (ref 3.2–4.5)
ALT SERPL-CCNC: 15 U/L (ref 0–55)
ANION GAP SERPL CALC-SCNC: 8 MMOL/L (ref 5–14)
AST SERPL-CCNC: 17 U/L (ref 5–34)
BASOPHILS # BLD AUTO: 0.1 10^3/UL (ref 0–0.1)
BASOPHILS NFR BLD AUTO: 1 % (ref 0–10)
BILIRUB SERPL-MCNC: 0.3 MG/DL (ref 0.1–1)
BUN SERPL-MCNC: 14 MG/DL (ref 7–18)
BUN/CREAT SERPL: 18
CALCIUM SERPL-MCNC: 8.7 MG/DL (ref 8.5–10.1)
CHLORIDE SERPL-SCNC: 110 MMOL/L (ref 98–107)
CO2 SERPL-SCNC: 22 MMOL/L (ref 21–32)
CREAT SERPL-MCNC: 0.79 MG/DL (ref 0.6–1.3)
EOSINOPHIL # BLD AUTO: 0.3 10^3/UL (ref 0–0.3)
EOSINOPHIL NFR BLD AUTO: 5 % (ref 0–10)
ERYTHROCYTE [DISTWIDTH] IN BLOOD BY AUTOMATED COUNT: 13.7 % (ref 10–14.5)
GFR SERPLBLD BASED ON 1.73 SQ M-ARVRAT: > 60 ML/MIN
GLUCOSE SERPL-MCNC: 101 MG/DL (ref 70–105)
LYMPHOCYTES # BLD AUTO: 1.7 X 10^3 (ref 1–4)
LYMPHOCYTES NFR BLD AUTO: 27 % (ref 12–44)
MCH RBC QN AUTO: 27 PG (ref 25–34)
MCHC RBC AUTO-ENTMCNC: 31 G/DL (ref 32–36)
MCV RBC AUTO: 86 FL (ref 80–99)
MONOCYTES # BLD AUTO: 0.5 X 10^3 (ref 0–1)
MONOCYTES NFR BLD AUTO: 8 % (ref 0–12)
NEUTROPHILS # BLD AUTO: 3.6 X 10^3 (ref 1.8–7.8)
NEUTROPHILS NFR BLD AUTO: 59 % (ref 42–75)
PLATELET # BLD: 244 10^3/UL (ref 130–400)
PMV BLD AUTO: 9.4 FL (ref 7.4–10.4)
POTASSIUM SERPL-SCNC: 3.7 MMOL/L (ref 3.6–5)
PROT SERPL-MCNC: 6.5 G/DL (ref 6.4–8.2)
RBC # BLD AUTO: 4.16 10^6/UL (ref 4.35–5.85)
SODIUM SERPL-SCNC: 140 MMOL/L (ref 135–145)
WBC # BLD AUTO: 6.1 10^3/UL (ref 4.3–11)

## 2017-05-11 PROCEDURE — 94760 N-INVAS EAR/PLS OXIMETRY 1: CPT

## 2017-05-11 PROCEDURE — 87040 BLOOD CULTURE FOR BACTERIA: CPT

## 2017-05-11 PROCEDURE — 87205 SMEAR GRAM STAIN: CPT

## 2017-05-11 PROCEDURE — 83605 ASSAY OF LACTIC ACID: CPT

## 2017-05-11 PROCEDURE — 87075 CULTR BACTERIA EXCEPT BLOOD: CPT

## 2017-05-11 PROCEDURE — 85025 COMPLETE CBC W/AUTO DIFF WBC: CPT

## 2017-05-11 PROCEDURE — 80048 BASIC METABOLIC PNL TOTAL CA: CPT

## 2017-05-11 PROCEDURE — 85652 RBC SED RATE AUTOMATED: CPT

## 2017-05-11 PROCEDURE — 36415 COLL VENOUS BLD VENIPUNCTURE: CPT

## 2017-05-11 PROCEDURE — 80053 COMPREHEN METABOLIC PANEL: CPT

## 2017-05-11 PROCEDURE — 80202 ASSAY OF VANCOMYCIN: CPT

## 2017-05-11 PROCEDURE — 94640 AIRWAY INHALATION TREATMENT: CPT

## 2017-05-11 PROCEDURE — 87081 CULTURE SCREEN ONLY: CPT

## 2017-05-11 PROCEDURE — 87070 CULTURE OTHR SPECIMN AEROBIC: CPT

## 2017-05-11 RX ADMIN — OXYCODONE HYDROCHLORIDE AND ACETAMINOPHEN PRN TAB: 10; 325 TABLET ORAL at 13:45

## 2017-05-11 RX ADMIN — Medication SCH ML: at 20:41

## 2017-05-11 RX ADMIN — Medication SCH ML: at 13:43

## 2017-05-11 RX ADMIN — OXYCODONE HYDROCHLORIDE AND ACETAMINOPHEN PRN TAB: 10; 325 TABLET ORAL at 20:07

## 2017-05-11 RX ADMIN — SIMVASTATIN SCH MG: 10 TABLET, FILM COATED ORAL at 20:06

## 2017-05-11 RX ADMIN — IMIPRAMINE HYDROCHLORIDE SCH MG: 25 TABLET ORAL at 20:07

## 2017-05-11 RX ADMIN — SODIUM CHLORIDE SCH MLS/HR: 900 INJECTION INTRAVENOUS at 20:06

## 2017-05-11 RX ADMIN — OXYBUTYNIN CHLORIDE SCH MG: 5 TABLET ORAL at 20:07

## 2017-05-11 RX ADMIN — VANCOMYCIN HYDROCHLORIDE SCH MLS/HR: 500 INJECTION, POWDER, LYOPHILIZED, FOR SOLUTION INTRAVENOUS at 20:41

## 2017-05-11 RX ADMIN — TOPIRAMATE SCH MG: 100 TABLET, FILM COATED ORAL at 20:07

## 2017-05-11 RX ADMIN — DOXEPIN HYDROCHLORIDE SCH MG: 25 CAPSULE ORAL at 20:07

## 2017-05-11 NOTE — HISTORY & PHYSICAL-HOSPITALIST
HPI


History of Present Illness:


HPI/Chief Complaint


CC: Recurrent right ankle fracture.





HPI: This is a 61 yoWF pt who presented to Dr. Young's office in [place] 

for a follow up on the previous right ankle fracture. Upon examination, Dr. Young found that the pt's ankle had refractured.





Patient Interview:


Pt confirmed that she saw Dr. Young in his [place] office. Pt stated that 

Dr. Young took her boot off and confirmed that her ankle had re-fractured.


Pt was informed that she will have her bloodwork done soon and that she will 

soon be receiving pain meds


Pt confirmed that she has been wearing a boot for the past month.


Physical exam stable.


Pt confirmed that her Picc line was paced on 5/4/17


Pt confirmed that she had been receiving antibiotics two times a day with home 

health


Pt stated that she takes tramadol and muscle relaxers at home, but these are 

unhelpful





Scribed by Tiny Santos under the direct supervision of Dr. Mars.


Source:  patient


Exam Limitations:  no limitations


Date Seen


5/11/17


Attending Physician


Ana Laura Mars Rachel L MD


Referring Physician





Date of Admission


May 11, 2017 at 12:05





Home Medications & Allergies


Home Medications


Reviewed patient Home Medication Reconciliation Form





Allergies





Allergies


Coded Allergies


  codeine (Verified Allergy, Mild, 5/11/17)








Past Medical-Social-Family Hx


Patient Social History


Marrital Status:  single


Employed/Student:  unemployed


Smoking Status:  Current Everyday Smoker


Type Used:  Cigarettes


Recent Foreign Travel:  No


Contact w/other who traveled:  No


Recent Hopitalizations:  No





Immunizations Up To Date


Date of Pneumonia Vaccine:  Apr 1, 2011





Seasonal Allergies


Seasonal Allergies:  No





Surgeries


HX Surgeries:  Yes (c-sections,knee and ankle surgery)





Respiratory


Hx Respiratory Disorders:  Yes (COPD)


Respiratory Disorders:  Asthma, COPD





Cardiovascular


Hx Cardiovascular Disorders:  No





Neurological


Hx Neurological Disorders:  No





Reproductive System


Hx Reproductive Disorders:  No





Genitourinary


Hx Genitourinary Disorders:  Yes





Gastrointestinal


Hx Gastrointestinal Disorders:  No





Musculoskeletal


Hx Musculoskeletal Disorders:  Yes (ARTHRITIS)


Musculoskeletal Disorders:  Arthritis





Endocrine


Hx Endocrine Disorders:  Yes


Endocrine Disorders:  Hypothyroidsim





HEENT


HX ENT Disorders:  No





Cancer


Hx Cancer:  No





Psychosocial


Hx Psychiatric Problems:  Yes


Behavioral Health Disorders:  Depression





Integumentary


HX Skin/Integumentary Disorder:  No





Blood Transfusions


Hx Blood Disorders:  No





Family Medical History


Other Significan Family Hx:  


Mom: alive


Family Hx:  





Review of Systems


Constitutional:  see HPI


EENTM:  no symptoms reported


Respiratory:  no symptoms reported


Cardiovascular:  no symptoms reported


Gastrointestinal:  no symptoms reported


Genitourinary:  no symptoms reported


Musculoskeletal:  joint pain


Skin:  no symptoms reported


Psychiatric/Neurological:  No Symptoms Reported


All Other Systems Reviewed


Negative Unless Noted:  Yes





Physical Exam


Physical Exam


Vital Signs


Capillary Refill :


General Appearance:  No Apparent Distress, WD/WN, Chronically ill, Obese


Eyes:  Bilateral Eye Normal Inspection, Bilateral Eye PERRL


HEENT:  PERRL/EOMI, Normal ENT Inspection, Pharynx Normal


Neck:  Full Range of Motion, Normal Inspection, Non Tender, Supple, Carotid 

Bruit


Respiratory:  Chest Non Tender, Lungs Clear, Normal Breath Sounds, No Accessory 

Muscle Use, No Respiratory Distress


Cardiovascular:  Regular Rate, Rhythm, No Edema, No Gallop, No JVD, No Murmur, 

Normal Peripheral Pulses


Gastrointestinal:  Normal Bowel Sounds, No Organomegaly, No Pulsatile Mass, Non 

Tender, Soft


Back:  Normal Inspection, No CVA Tenderness, No Vertebral Tenderness


Extremity:  Normal Capillary Refill, Normal Inspection, Normal Range of Motion (

right ankle in dressing), Non Tender, No Calf Tenderness, No Pedal Edema


Neurologic/Psychiatric:  Alert, Oriented x3, No Motor/Sensory Deficits, Normal 

Mood/Affect


Skin:  Normal Color, Warm/Dry


Lymphatic:  No Adenopathy





Assessment/Plan


Admission Diagnosis


Assessment:


Charcot ankle with recurrent fracture in need of removal of hardware and IV 

antibiotics with bone biopsy presumed osteomyelitis


COPD


Current smoker


Migraines


Hypothyroidism


Chronic pain





Assessment and Plan


Plan:


Lunch and supper today but NPO after midnight


Reconcile home meds


Labs


Pain meds


Lovenox after surgery





Clinical Quality Measures


DVT/VTE Risk/Contraindication:


Contraindications-Pharm:  Other *list below*


Other:  


OR tomorrow











ANA LAURA MARS DO May 11, 2017 12:44

## 2017-05-12 VITALS — SYSTOLIC BLOOD PRESSURE: 144 MMHG | DIASTOLIC BLOOD PRESSURE: 81 MMHG

## 2017-05-12 VITALS — SYSTOLIC BLOOD PRESSURE: 164 MMHG | DIASTOLIC BLOOD PRESSURE: 88 MMHG

## 2017-05-12 VITALS — SYSTOLIC BLOOD PRESSURE: 147 MMHG | DIASTOLIC BLOOD PRESSURE: 55 MMHG

## 2017-05-12 VITALS — DIASTOLIC BLOOD PRESSURE: 83 MMHG | SYSTOLIC BLOOD PRESSURE: 146 MMHG

## 2017-05-12 VITALS — SYSTOLIC BLOOD PRESSURE: 121 MMHG | DIASTOLIC BLOOD PRESSURE: 72 MMHG

## 2017-05-12 LAB
ALBUMIN SERPL-MCNC: 3.5 G/DL (ref 3.2–4.5)
ALT SERPL-CCNC: 14 U/L (ref 0–55)
ANION GAP SERPL CALC-SCNC: 6 MMOL/L (ref 5–14)
AST SERPL-CCNC: 18 U/L (ref 5–34)
BASOPHILS # BLD AUTO: 0.1 10^3/UL (ref 0–0.1)
BASOPHILS NFR BLD AUTO: 1 % (ref 0–10)
BILIRUB SERPL-MCNC: 0.3 MG/DL (ref 0.1–1)
BUN SERPL-MCNC: 12 MG/DL (ref 7–18)
BUN/CREAT SERPL: 15
CALCIUM SERPL-MCNC: 8.8 MG/DL (ref 8.5–10.1)
CHLORIDE SERPL-SCNC: 111 MMOL/L (ref 98–107)
CO2 SERPL-SCNC: 27 MMOL/L (ref 21–32)
CREAT SERPL-MCNC: 0.78 MG/DL (ref 0.6–1.3)
EOSINOPHIL # BLD AUTO: 0.2 10^3/UL (ref 0–0.3)
EOSINOPHIL NFR BLD AUTO: 5 % (ref 0–10)
ERYTHROCYTE [DISTWIDTH] IN BLOOD BY AUTOMATED COUNT: 13.9 % (ref 10–14.5)
GFR SERPLBLD BASED ON 1.73 SQ M-ARVRAT: > 60 ML/MIN
GLUCOSE SERPL-MCNC: 101 MG/DL (ref 70–105)
LYMPHOCYTES # BLD AUTO: 1.7 X 10^3 (ref 1–4)
LYMPHOCYTES NFR BLD AUTO: 34 % (ref 12–44)
MCH RBC QN AUTO: 27 PG (ref 25–34)
MCHC RBC AUTO-ENTMCNC: 31 G/DL (ref 32–36)
MCV RBC AUTO: 86 FL (ref 80–99)
MONOCYTES # BLD AUTO: 0.4 X 10^3 (ref 0–1)
MONOCYTES NFR BLD AUTO: 9 % (ref 0–12)
NEUTROPHILS # BLD AUTO: 2.5 X 10^3 (ref 1.8–7.8)
NEUTROPHILS NFR BLD AUTO: 51 % (ref 42–75)
PLATELET # BLD: 232 10^3/UL (ref 130–400)
PMV BLD AUTO: 9.9 FL (ref 7.4–10.4)
POTASSIUM SERPL-SCNC: 3.8 MMOL/L (ref 3.6–5)
PROT SERPL-MCNC: 6.3 G/DL (ref 6.4–8.2)
RBC # BLD AUTO: 4.02 10^6/UL (ref 4.35–5.85)
SODIUM SERPL-SCNC: 144 MMOL/L (ref 135–145)
WBC # BLD AUTO: 4.9 10^3/UL (ref 4.3–11)

## 2017-05-12 PROCEDURE — 0QPG04Z REMOVAL OF INTERNAL FIXATION DEVICE FROM RIGHT TIBIA, OPEN APPROACH: ICD-10-PCS | Performed by: ORTHOPAEDIC SURGERY

## 2017-05-12 PROCEDURE — 0QPJ04Z REMOVAL OF INTERNAL FIXATION DEVICE FROM RIGHT FIBULA, OPEN APPROACH: ICD-10-PCS | Performed by: ORTHOPAEDIC SURGERY

## 2017-05-12 PROCEDURE — 0SSF05Z REPOSITION RIGHT ANKLE JOINT WITH EXTERNAL FIXATION DEVICE, OPEN APPROACH: ICD-10-PCS | Performed by: PODIATRIST

## 2017-05-12 RX ADMIN — FLUTICASONE PROPIONATE AND SALMETEROL XINAFOATE SCH PUFF: 115; 21 AEROSOL, METERED RESPIRATORY (INHALATION) at 07:25

## 2017-05-12 RX ADMIN — SODIUM CHLORIDE, SODIUM LACTATE, POTASSIUM CHLORIDE, AND CALCIUM CHLORIDE PRN MLS/HR: 600; 310; 30; 20 INJECTION, SOLUTION INTRAVENOUS at 15:06

## 2017-05-12 RX ADMIN — UMECLIDINIUM SCH INH: 62.5 AEROSOL, POWDER ORAL at 07:24

## 2017-05-12 RX ADMIN — SODIUM CHLORIDE SCH MLS/HR: 900 INJECTION INTRAVENOUS at 09:59

## 2017-05-12 RX ADMIN — SODIUM CHLORIDE, SODIUM LACTATE, POTASSIUM CHLORIDE, AND CALCIUM CHLORIDE PRN MLS/HR: 600; 310; 30; 20 INJECTION, SOLUTION INTRAVENOUS at 12:25

## 2017-05-12 RX ADMIN — OXYCODONE HYDROCHLORIDE AND ACETAMINOPHEN PRN TAB: 10; 325 TABLET ORAL at 16:49

## 2017-05-12 RX ADMIN — NAPROXEN SCH MG: 250 TABLET ORAL at 05:45

## 2017-05-12 RX ADMIN — OXYCODONE HYDROCHLORIDE AND ACETAMINOPHEN PRN TAB: 10; 325 TABLET ORAL at 23:43

## 2017-05-12 RX ADMIN — OXYBUTYNIN CHLORIDE SCH MG: 5 TABLET ORAL at 16:57

## 2017-05-12 RX ADMIN — NAPROXEN SCH MG: 250 TABLET ORAL at 16:57

## 2017-05-12 RX ADMIN — FENTANYL CITRATE PRN MCG: 50 INJECTION, SOLUTION INTRAMUSCULAR; INTRAVENOUS at 17:02

## 2017-05-12 RX ADMIN — TOPIRAMATE SCH MG: 100 TABLET, FILM COATED ORAL at 16:56

## 2017-05-12 RX ADMIN — SODIUM CHLORIDE SCH MLS/HR: 900 INJECTION INTRAVENOUS at 20:10

## 2017-05-12 RX ADMIN — OXYCODONE HYDROCHLORIDE AND ACETAMINOPHEN PRN TAB: 10; 325 TABLET ORAL at 05:58

## 2017-05-12 RX ADMIN — SIMVASTATIN SCH MG: 10 TABLET, FILM COATED ORAL at 20:10

## 2017-05-12 RX ADMIN — VANCOMYCIN HYDROCHLORIDE SCH MLS/HR: 500 INJECTION, POWDER, LYOPHILIZED, FOR SOLUTION INTRAVENOUS at 20:46

## 2017-05-12 RX ADMIN — OXYBUTYNIN CHLORIDE SCH MG: 5 TABLET ORAL at 20:10

## 2017-05-12 RX ADMIN — VANCOMYCIN HYDROCHLORIDE SCH MLS/HR: 500 INJECTION, POWDER, LYOPHILIZED, FOR SOLUTION INTRAVENOUS at 07:48

## 2017-05-12 RX ADMIN — FLUTICASONE PROPIONATE AND SALMETEROL XINAFOATE SCH PUFF: 115; 21 AEROSOL, METERED RESPIRATORY (INHALATION) at 19:21

## 2017-05-12 RX ADMIN — Medication SCH ML: at 22:00

## 2017-05-12 RX ADMIN — FENTANYL CITRATE PRN MCG: 50 INJECTION, SOLUTION INTRAMUSCULAR; INTRAVENOUS at 23:43

## 2017-05-12 RX ADMIN — DOXEPIN HYDROCHLORIDE SCH MG: 25 CAPSULE ORAL at 20:10

## 2017-05-12 RX ADMIN — TOPIRAMATE SCH MG: 100 TABLET, FILM COATED ORAL at 20:10

## 2017-05-12 RX ADMIN — Medication SCH ML: at 17:02

## 2017-05-12 RX ADMIN — MORPHINE SULFATE PRN MG: 10 INJECTION, SOLUTION INTRAMUSCULAR; INTRAVENOUS at 15:35

## 2017-05-12 RX ADMIN — LEVOTHYROXINE SODIUM SCH MCG: 100 TABLET ORAL at 05:58

## 2017-05-12 RX ADMIN — OXYCODONE HYDROCHLORIDE AND ACETAMINOPHEN PRN TAB: 10; 325 TABLET ORAL at 07:24

## 2017-05-12 RX ADMIN — MORPHINE SULFATE PRN MG: 10 INJECTION, SOLUTION INTRAMUSCULAR; INTRAVENOUS at 15:30

## 2017-05-12 RX ADMIN — FLUTICASONE PROPIONATE AND SALMETEROL XINAFOATE SCH PUFF: 115; 21 AEROSOL, METERED RESPIRATORY (INHALATION) at 07:24

## 2017-05-12 RX ADMIN — DULOXETINE HYDROCHLORIDE SCH MG: 30 CAPSULE, DELAYED RELEASE ORAL at 16:57

## 2017-05-12 RX ADMIN — FENTANYL CITRATE PRN MCG: 50 INJECTION, SOLUTION INTRAMUSCULAR; INTRAVENOUS at 20:10

## 2017-05-12 RX ADMIN — Medication SCH ML: at 05:58

## 2017-05-12 RX ADMIN — IMIPRAMINE HYDROCHLORIDE SCH MG: 25 TABLET ORAL at 20:10

## 2017-05-12 NOTE — PROGRESS NOTE-HOSPITALIST
Progress Note


HPI/CC on Admission


CC: Recurrent right ankle fracture.





HPI: This is a 61 yoWF pt who presented to Dr. Young's office in [place] 

for a follow up on the previous right ankle fracture. Upon examination, Dr. Young found that the pt's ankle had refractured.





Patient Interview:


Pt confirmed that she saw Dr. Young in his [place] office. Pt stated that 

Dr. Young took her boot off and confirmed that her ankle had re-fractured.


Pt was informed that she will have her bloodwork done soon and that she will 

soon be receiving pain meds


Pt confirmed that she has been wearing a boot for the past month.


Physical exam stable.


Pt confirmed that her Picc line was paced on 5/4/17


Pt confirmed that she had been receiving antibiotics two times a day with home 

health


Pt stated that she takes tramadol and muscle relaxers at home, but these are 

unhelpful





Scribed by Tiny Santos under the direct supervision of Dr. Montoya.





Progress Notes/Assess & Plan


Date Seen


5/12/17


Admission Dx/Process


Assessment:


Charcot ankle with recurrent fracture in need of removal of hardware and IV 

antibiotics with bone biopsy presumed osteomyelitis


COPD


Current smoker


Migraines


Hypothyroidism


Chronic pain


Diagonsis/Assessment & Plan


Chart Review: No fever, Vitals stable, Preparing for sx to remove hardware from 

right ankle by Dr. Young. WBC 4.9, Hgb 10.9, CMP normal





Patient Interview: Pt states she will have surgery today at 1300. Pt denies BM 

today but states she had BM yesterday. Physical exam was stable. Pt was 

informed her home meds have been restarted. Pt was told that she will be at [facility] 

for the weekend. Pt states she has no concerns currently.





Scribed by Teto Elliott under the direct supervision of Dr. Montoya.





No fever, vital signs stable, pleasant, improved


Regular rate and rhythm, clear to auscultation bilaterally but diminished in 

the bases


No edema





Laboratory Tests


5/12/17 05:56














Assessment:


Charcot ankle with recurrent fracture in need of removal of hardware and IV 

antibiotics with bone biopsy presumed osteomyelitis placed on Vanc and Cefepime 

as ordered on 5/4/17 via home health per culture results


COPD


Current smoker


Migraines


Hypothyroidism


Chronic pain





Plan:


Reconciled home meds


Labs


Pain meds


Lovenox after surgery today for DVT Px


[place] at [place] Monday back to [facility]











NACHO MONTOYA DO May 12, 2017 11:58

## 2017-05-12 NOTE — DIAGNOSTIC IMAGING REPORT
INDICATION: 

Right ankle fracture.



FINDINGS:

Views were obtained with the portable image intensifier in

Surgery during placement of an external fixation device. Two

screws are visualized in the tibial shaft. Two screws are

visualized in the calcaneus. The previous orthopedic hardware

seen on 05/01/2017 in the distal fibula and tibia has been

removed.



FLUOROSCOPY TIME: 

41 seconds of fluoroscopy time was used.



IMPRESSION:

Removal of the previous hardware from the right ankle with

placement of an external fixation device with two screws

visualized in the mid tibial shaft as well as in the calcaneal

region.



Dictated by: 



  Dictated on workstation # [id number]

## 2017-05-12 NOTE — PROGRESS NOTE-POST OPERATIVE
Post-Operative Progess Note


Surgeon (s)/Assistant (s)


Surgeon


RUBA RODRÍGUEZ DPM


Assistant:  none





Pre-Operative Diagnosis


Charcot Arthropathy Right Ankle





Post-Operative Diagnosis





same





Procedure & Operative Findings


Date of Procedure


5/12/17


Procedure Preformed/Findings


Removal of Hardware Right Ankle


ExFix Application Right Ankle


Anesthesia Type


GA





Estimated Blood Loss


Estimated blood loss (mL):  100cc





Specimens/Packing


Specimens Removed


Cultures taken


Packing:  


none











RUBA RODRÍGUEZ DPM May 12, 2017 15:20

## 2017-05-12 NOTE — CONSULTATION
History of Present Illness


History of Present Illness


Patient Consulted On(david/time)


17


 13:01


Date of Admission





History of Present Illness


Pt is 10 weeks post op for ORIF Right open ankle fracture.  At this time has 

charcot development of the right ankle with concern of possible osteomyelitis.  

Pt recently place on IV abx for nonhealing wound.  At this time plan for 

removal of hardware and exfix placement.  Await cultures for definitive surgery.





Allergies and Home Medications


Allergies


Coded Allergies:  


     codeine (Verified  Allergy, Mild, 17)





Home Medications


Albuterol Sulfate 2.5 Mg/3 Ml Vial.neb, 3 ML IH EVERY 4-6 HOURS PRN for 

SHORTNESS OF BREATH, (Reported)


Budesonide/Formoterol Fumarate 10.2 Gm Hfa.aer.ad, 2 PUFF IH BID PRN for 

SHORTNESS OF BREATH, (Reported)


Desmopressin Acetate 0.2 Mg Tablet, 0.6 MG PO HS, (Reported)


   TAKES 3 (0.2 MG) TABLETS / LAST FILLED 17 #90 


Doxepin HCl 50 Mg Capsule, 50 MG PO HS, (Reported)


   LAST FILLED 17 #30 


Duloxetine HCl 30 Mg Capsule.dr, 30 MG PO DAILY, (Reported)


   TAKES ALONG WITH DULOXETINE 60MG DAILY 


Duloxetine HCl 60 Mg Capsule.dr, 60 MG PO DAILY, (Reported)


   TAKES ALONG WITH DULOXETINE 30MG DAILY  


Imipramine HCl 25 Mg Tablet, 25 MG PO HS, (Reported)


Levothyroxine Sodium 100 Mcg Tablet, 100 MCG PO DAILY, (Reported)


Naproxen 500 Mg Tablet, 500 MG PO BID, (Reported)


Oxybutynin Chloride 10 Mg Tab.er.24, 10 MG PO HS, (Reported)


   LAST FILLED 17 #30 


Oxycodone HCl/Acetaminophen 1 Each Tablet, 1-2 TAB PO Q6H PRN for PAIN-SEVERE, (

Reported)


Simvastatin 10 Mg Tablet, 10 MG PO HS, (Reported)


Tiotropium Bromide 1 Inh Aerp, 2 PUFF IH DAILY, (Reported)


   LAST FILLED 17 #1 INHALER 


Tizanidine HCl 4 Mg Tablet, 4 MG PO Q8H, (Reported)


Topiramate 100 Mg Tablet, 100 MG PO BID, (Reported)


Tramadol HCl 50 Mg Tablet, 50 MG PO Q8H PRN for PAIN-MODERATE, (Reported)





Past Medical-Social-Family Hx


Patient Social History


Alcohol Use:  Denies Use


Recreational Drug Use:  No


Smoking Status:  Current Everyday Smoker


Type Used:  Cigarettes


Recent Foreign Travel:  No


Contact w/Someone Who Travel:  No


Recent Infectious Disease Expo:  No


Recent Hopitalizations:  No


Physical Abuse Screen:  No


Sexual Abuse:  No





Immunizations Up To Date


Date of Pneumonia Vaccine:  2011





Seasonal Allergies


Seasonal Allergies:  No





Surgeries


HX Surgeries:  Yes (c-sections,knee and ankle surgery)





Respiratory


Hx Respiratory Disorders:  Yes (COPD)


Respiratory Disorders:  Asthma, Chronic Bronchitis, COPD





Cardiovascular


Hx Cardiac Disorders:  No





Neurological


Hx Neurological Disorders:  No





Reproductive System


Hx Reproductive Disorders:  No





Genitourinary


Hx Genitourinary Disorders:  Yes





Gastrointestinal


Hx Gastrointestinal Disorders:  No





Musculoskeletal


Hx Musculoskeletal Disorders:  Yes (ARTHRITIS)


Musculoskeletal Disorders:  Arthritis





Endocrine


Hx Endocrine Disorders:  Yes


Endocrine Disorders:  Hypothyroidsim





HEENT


HX ENT Disorders:  No





Cancer


Hx Cancer:  No





Psychosocial


Hx Psychiatric Problems:  Yes


Behavioral Health Disorders:  Depression





Integumentary


HX Skin/Integumentary Disorder:  No





Blood Transfusions


Hx Blood Disorders:  No





Family Medical History


Family Medial History:  


Patient reports no known family medical history.





Review of Systems-General


Constitutional:  No no symptoms reported, No see HPI, No chills, No diaphoresis

, No dizziness, No fever, No malaise, No weakness, No weight gain, No weight 

loss, No other


Cardiovascular:  No no symptoms reported, No see HPI, No chest pain, No edema, 

No Hx of Intervention, No palpitations, No syncope, No vascular heart diseas, 

No other





Physical Exam-General Problems


Physical Exam


Vital Signs





Vital Sign - Last 12Hours








 17





 12:00


 


Temp 98.0


 


Pulse 86


 


Resp 20


 


B/P (MAP) 112/78


 


Pulse Ox 97


 


O2 Delivery Room Air





Capillary Refill : Less Than 3 Seconds


Comments


RLE- N/V intact, wound to the lateral ankle no erythema, no purulence/fluctuance

, calves supple nontender, varus ankle deformity noted.





Assessment/Plan


Assessment/Plan


Admission Diagnosis/Plan


Charcot Ankle RLE


Nonhealing wound Right Ankle


Presuming osteomyelitis RLE





-For removal of hardware today with intra-operative cultures and possible ex 

fix application


-cont IV abx await final cultures. 


-NWB RLE





Clinical Quality Measures


DVT/VTE Risk/Contraindication:


Risk Factor Score Per Nursin


RFS Level Per Nursing on Admit:  4+=Very High


Contraindications-Pharm:  Other *list below*


Other:  


OR tomorrow











RUBA RODRÍGUEZ DPM May 12, 2017 13:06

## 2017-05-13 VITALS — DIASTOLIC BLOOD PRESSURE: 70 MMHG | SYSTOLIC BLOOD PRESSURE: 115 MMHG

## 2017-05-13 VITALS — SYSTOLIC BLOOD PRESSURE: 134 MMHG | DIASTOLIC BLOOD PRESSURE: 79 MMHG

## 2017-05-13 VITALS — DIASTOLIC BLOOD PRESSURE: 99 MMHG | SYSTOLIC BLOOD PRESSURE: 142 MMHG

## 2017-05-13 VITALS — SYSTOLIC BLOOD PRESSURE: 121 MMHG | DIASTOLIC BLOOD PRESSURE: 81 MMHG

## 2017-05-13 VITALS — DIASTOLIC BLOOD PRESSURE: 74 MMHG | SYSTOLIC BLOOD PRESSURE: 135 MMHG

## 2017-05-13 VITALS — SYSTOLIC BLOOD PRESSURE: 111 MMHG | DIASTOLIC BLOOD PRESSURE: 66 MMHG

## 2017-05-13 RX ADMIN — ENOXAPARIN SODIUM SCH MG: 100 INJECTION SUBCUTANEOUS at 07:44

## 2017-05-13 RX ADMIN — FENTANYL CITRATE PRN MCG: 50 INJECTION, SOLUTION INTRAMUSCULAR; INTRAVENOUS at 17:04

## 2017-05-13 RX ADMIN — VANCOMYCIN HYDROCHLORIDE SCH MLS/HR: 500 INJECTION, POWDER, LYOPHILIZED, FOR SOLUTION INTRAVENOUS at 21:01

## 2017-05-13 RX ADMIN — FENTANYL CITRATE PRN MCG: 50 INJECTION, SOLUTION INTRAMUSCULAR; INTRAVENOUS at 13:02

## 2017-05-13 RX ADMIN — TOPIRAMATE SCH MG: 100 TABLET, FILM COATED ORAL at 07:43

## 2017-05-13 RX ADMIN — UMECLIDINIUM SCH INH: 62.5 AEROSOL, POWDER ORAL at 07:18

## 2017-05-13 RX ADMIN — FENTANYL CITRATE PRN MCG: 50 INJECTION, SOLUTION INTRAMUSCULAR; INTRAVENOUS at 10:06

## 2017-05-13 RX ADMIN — SODIUM CHLORIDE SCH MLS/HR: 900 INJECTION INTRAVENOUS at 20:30

## 2017-05-13 RX ADMIN — FLUTICASONE PROPIONATE AND SALMETEROL XINAFOATE SCH PUFF: 115; 21 AEROSOL, METERED RESPIRATORY (INHALATION) at 19:18

## 2017-05-13 RX ADMIN — FENTANYL CITRATE PRN MCG: 50 INJECTION, SOLUTION INTRAMUSCULAR; INTRAVENOUS at 01:56

## 2017-05-13 RX ADMIN — Medication SCH ML: at 22:00

## 2017-05-13 RX ADMIN — DOXEPIN HYDROCHLORIDE SCH MG: 25 CAPSULE ORAL at 20:30

## 2017-05-13 RX ADMIN — OXYCODONE HYDROCHLORIDE AND ACETAMINOPHEN PRN TAB: 10; 325 TABLET ORAL at 13:02

## 2017-05-13 RX ADMIN — NAPROXEN SCH MG: 250 TABLET ORAL at 17:04

## 2017-05-13 RX ADMIN — Medication SCH ML: at 05:47

## 2017-05-13 RX ADMIN — NAPROXEN SCH MG: 250 TABLET ORAL at 05:47

## 2017-05-13 RX ADMIN — OXYBUTYNIN CHLORIDE SCH MG: 5 TABLET ORAL at 20:30

## 2017-05-13 RX ADMIN — OXYCODONE HYDROCHLORIDE AND ACETAMINOPHEN PRN TAB: 10; 325 TABLET ORAL at 20:30

## 2017-05-13 RX ADMIN — FENTANYL CITRATE PRN MCG: 50 INJECTION, SOLUTION INTRAMUSCULAR; INTRAVENOUS at 23:46

## 2017-05-13 RX ADMIN — SODIUM CHLORIDE SCH MLS/HR: 900 INJECTION INTRAVENOUS at 07:44

## 2017-05-13 RX ADMIN — VANCOMYCIN HYDROCHLORIDE SCH MLS/HR: 500 INJECTION, POWDER, LYOPHILIZED, FOR SOLUTION INTRAVENOUS at 07:44

## 2017-05-13 RX ADMIN — LEVOTHYROXINE SODIUM SCH MCG: 100 TABLET ORAL at 05:44

## 2017-05-13 RX ADMIN — OXYCODONE HYDROCHLORIDE AND ACETAMINOPHEN PRN TAB: 10; 325 TABLET ORAL at 05:44

## 2017-05-13 RX ADMIN — Medication SCH ML: at 13:05

## 2017-05-13 RX ADMIN — DULOXETINE HYDROCHLORIDE SCH MG: 30 CAPSULE, DELAYED RELEASE ORAL at 07:43

## 2017-05-13 RX ADMIN — FLUTICASONE PROPIONATE AND SALMETEROL XINAFOATE SCH PUFF: 115; 21 AEROSOL, METERED RESPIRATORY (INHALATION) at 07:18

## 2017-05-13 RX ADMIN — TOPIRAMATE SCH MG: 100 TABLET, FILM COATED ORAL at 20:30

## 2017-05-13 RX ADMIN — IMIPRAMINE HYDROCHLORIDE SCH MG: 25 TABLET ORAL at 20:30

## 2017-05-13 RX ADMIN — OXYBUTYNIN CHLORIDE SCH MG: 5 TABLET ORAL at 07:42

## 2017-05-13 RX ADMIN — SIMVASTATIN SCH MG: 10 TABLET, FILM COATED ORAL at 20:30

## 2017-05-13 NOTE — PROGRESS NOTE-HOSPITALIST
Subjective


HPI/CC On Admission


CC: Recurrent right ankle fracture.





HPI: This is a 61 yoWF pt who presented to Dr. Young's office in [place] 

for a follow up on the previous right ankle fracture. Upon examination, Dr. Young found that the pt's ankle had refractured.





Patient Interview:


Pt confirmed that she saw Dr. Young in his [place] office. Pt stated that 

Dr. Young took her boot off and confirmed that her ankle had re-fractured.


Pt was informed that she will have her bloodwork done soon and that she will 

soon be receiving pain meds


Pt confirmed that she has been wearing a boot for the past month.


Physical exam stable.


Pt confirmed that her Picc line was paced on 5/4/17


Pt confirmed that she had been receiving antibiotics two times a day with home 

health


Pt stated that she takes tramadol and muscle relaxers at home, but these are 

unhelpful





Scribed by Tiny Santos under the direct supervision of Dr. Mars.


Date Seen


5/13/17


Subjective/Events-last exam


patient was resting with eyes closed appearing to be in no acute distress.  

When I woke her up she immediately began complaining about right ankle pain and 

moaning.  Otherwise she voices no other complaints.





Objective


Exam


Vital Signs





Vital Sign - Last 12Hours








 5/11/17 5/12/17





 12:00 16:00


 


Temp 98.0 


 


Pulse 86 


 


Resp 20 


 


B/P (MAP) 112/78 


 


Pulse Ox 97 


 


O2 Delivery Room Air 


 


O2 Flow Rate  1.00





Capillary Refill : Less Than 3 Seconds


General Appearance:  Chronically ill, Obese


Respiratory:  Chest Non Tender, Lungs Clear, Normal Breath Sounds, No Accessory 

Muscle Use, No Respiratory Distress


Cardiovascular:  Regular Rate, Rhythm, No Edema, No Gallop, No JVD, No Murmur, 

Normal Peripheral Pulses


Extremity:  Other (right foot is bandaged with external fixator on.  Toes are 

warm without pallor there appears to be no significant calf or thigh swelling.)





Assessment/Plan


Assessment and Plan


Assess & Plan/Chief Complaint


1.  Charcot deformity right foot with recurrent fracture status post external 

fixation continue anti-inflammatory and narcotic pain medication.


2. reported osteomyelitis right foot continue IV antibiotics.











ADINA BOJORQUEZ MD May 13, 2017 10:04

## 2017-05-13 NOTE — ANESTHESIA-GENERAL POST-OP
General


Patient Condition


Mental Status/LOC:  Same as Preop


Cardiovascular:  Satisfactory


Nausea/Vomiting:  Absent


Respiratory:  Satisfactory


Pain:  Controlled


Complications:  Absent





Post Op Complications


Complications


None





Follow Up Care/Instructions


Patient Instructions


None needed.





Anesthesia/Patient Condition


Patient Condition


Patient is doing well, no complaints, stable vital signs, no apparent adverse 

anesthesia problems.   


No complications reported per nursing.











SOL KRISHNAMURTHY CRNA May 13, 2017 19:02

## 2017-05-14 VITALS — DIASTOLIC BLOOD PRESSURE: 60 MMHG | SYSTOLIC BLOOD PRESSURE: 129 MMHG

## 2017-05-14 VITALS — SYSTOLIC BLOOD PRESSURE: 111 MMHG | DIASTOLIC BLOOD PRESSURE: 76 MMHG

## 2017-05-14 VITALS — DIASTOLIC BLOOD PRESSURE: 60 MMHG | SYSTOLIC BLOOD PRESSURE: 124 MMHG

## 2017-05-14 VITALS — DIASTOLIC BLOOD PRESSURE: 58 MMHG | SYSTOLIC BLOOD PRESSURE: 126 MMHG

## 2017-05-14 VITALS — DIASTOLIC BLOOD PRESSURE: 56 MMHG | SYSTOLIC BLOOD PRESSURE: 115 MMHG

## 2017-05-14 RX ADMIN — DOXEPIN HYDROCHLORIDE SCH MG: 25 CAPSULE ORAL at 22:08

## 2017-05-14 RX ADMIN — DULOXETINE HYDROCHLORIDE SCH MG: 30 CAPSULE, DELAYED RELEASE ORAL at 07:47

## 2017-05-14 RX ADMIN — FENTANYL CITRATE PRN MCG: 50 INJECTION, SOLUTION INTRAMUSCULAR; INTRAVENOUS at 22:29

## 2017-05-14 RX ADMIN — NAPROXEN SCH MG: 250 TABLET ORAL at 17:49

## 2017-05-14 RX ADMIN — OXYCODONE HYDROCHLORIDE AND ACETAMINOPHEN PRN TAB: 10; 325 TABLET ORAL at 20:21

## 2017-05-14 RX ADMIN — Medication SCH ML: at 22:09

## 2017-05-14 RX ADMIN — LEVOTHYROXINE SODIUM SCH MCG: 100 TABLET ORAL at 06:24

## 2017-05-14 RX ADMIN — SODIUM CHLORIDE SCH MLS/HR: 900 INJECTION INTRAVENOUS at 22:09

## 2017-05-14 RX ADMIN — ENOXAPARIN SODIUM SCH MG: 100 INJECTION SUBCUTANEOUS at 07:47

## 2017-05-14 RX ADMIN — UMECLIDINIUM SCH INH: 62.5 AEROSOL, POWDER ORAL at 07:26

## 2017-05-14 RX ADMIN — OXYCODONE HYDROCHLORIDE AND ACETAMINOPHEN PRN TAB: 10; 325 TABLET ORAL at 06:24

## 2017-05-14 RX ADMIN — VANCOMYCIN HYDROCHLORIDE SCH MLS/HR: 500 INJECTION, POWDER, LYOPHILIZED, FOR SOLUTION INTRAVENOUS at 20:21

## 2017-05-14 RX ADMIN — Medication SCH ML: at 06:24

## 2017-05-14 RX ADMIN — FENTANYL CITRATE PRN MCG: 50 INJECTION, SOLUTION INTRAMUSCULAR; INTRAVENOUS at 10:42

## 2017-05-14 RX ADMIN — FLUTICASONE PROPIONATE AND SALMETEROL XINAFOATE SCH PUFF: 115; 21 AEROSOL, METERED RESPIRATORY (INHALATION) at 07:26

## 2017-05-14 RX ADMIN — IMIPRAMINE HYDROCHLORIDE SCH MG: 25 TABLET ORAL at 22:08

## 2017-05-14 RX ADMIN — TOPIRAMATE SCH MG: 100 TABLET, FILM COATED ORAL at 07:47

## 2017-05-14 RX ADMIN — TOPIRAMATE SCH MG: 100 TABLET, FILM COATED ORAL at 22:08

## 2017-05-14 RX ADMIN — SODIUM CHLORIDE SCH MLS/HR: 900 INJECTION INTRAVENOUS at 09:55

## 2017-05-14 RX ADMIN — OXYBUTYNIN CHLORIDE SCH MG: 5 TABLET ORAL at 22:08

## 2017-05-14 RX ADMIN — FLUTICASONE PROPIONATE AND SALMETEROL XINAFOATE SCH PUFF: 115; 21 AEROSOL, METERED RESPIRATORY (INHALATION) at 18:58

## 2017-05-14 RX ADMIN — VANCOMYCIN HYDROCHLORIDE SCH MLS/HR: 500 INJECTION, POWDER, LYOPHILIZED, FOR SOLUTION INTRAVENOUS at 07:47

## 2017-05-14 RX ADMIN — OXYBUTYNIN CHLORIDE SCH MG: 5 TABLET ORAL at 07:47

## 2017-05-14 RX ADMIN — NAPROXEN SCH MG: 250 TABLET ORAL at 06:24

## 2017-05-14 RX ADMIN — OXYCODONE HYDROCHLORIDE AND ACETAMINOPHEN PRN TAB: 10; 325 TABLET ORAL at 13:36

## 2017-05-14 RX ADMIN — Medication SCH ML: at 10:42

## 2017-05-14 RX ADMIN — SIMVASTATIN SCH MG: 10 TABLET, FILM COATED ORAL at 22:08

## 2017-05-14 NOTE — PROGRESS NOTE (SOAP)
Subjective


Subjective/Events-last exam


POD #2, s/p right ankle hardware removal and application of external fixator


Complain of right ankle pain at this time


Review of Systems


General:  No Chills, No Night Sweats


Gastrointestinal:  No: Nausea


Musculoskeletal:  foot pain





Objective


Exam





Vital Signs








  Date Time  Temp Pulse Resp B/P (MAP) Pulse Ox O2 Delivery O2 Flow Rate FiO2


 


17 07:29       2.00 


 


17 07:26       2.00 


 


17 00:00 97.6 111 20 126/58 93   


 


17 21:00     95   


 


17 20:00 97.5 114 18 135/74 94   


 


17 19:18       2.00 


 


17 16:00 98.4 108 22 111/66 93   


 


17 11:55 98.8 95 20 115/70 92   














I & O 


 


 17





 07:00


 


Intake Total 3510 ml


 


Output Total 1850 ml


 


Balance 1660 ml





Capillary Refill : Less Than 3 Seconds


General Appearance:  No Apparent Distress


Extremity:  Normal Capillary Refill, No Calf Tenderness, Other (Right ankle 

dressed with external fixator in place. capillary refill and sensation intact 

in right foot )


Neurologic/Psychiatric:  Alert, Oriented x3





Results


Lab





Microbiology


17 Blood Culture - Preliminary, Resulted


          No growth


17 Gram Stain - Final, Resulted


          


17 Anaerobic Culture, Resulted


          Pending


17 Surgical Culture - Preliminary, Resulted


          No growth


17 MRSA Screen - Final, Complete


          MRSA not isolated





Assessment/Plan


Assessment/Plan


Assess & Plan/Chief Complaint


Charcot deformity right foot with recurrent fracture status post external 

fixation


reported osteomyelitis right foot continue IV antibiotics.


Increase Percocet at this time in an attempt to limit IV narcotic





Clinical Quality Measures


DVT/VTE Risk/Contraindication:


Risk Factor Score Per Nursin


RFS Level Per Nursing on Admit:  4+=Very High


Contraindications-Pharm:  Other *list below*


Other:  


OR tomorrow











ANNIE GODOY May 14, 2017 07:49

## 2017-05-14 NOTE — PROGRESS NOTE-HOSPITALIST
Subjective


HPI/CC On Admission


CC: Recurrent right ankle fracture.





HPI: This is a 61 yoWF pt who presented to Dr. Young's office in [place] 

for a follow up on the previous right ankle fracture. Upon examination, Dr. Young found that the pt's ankle had refractured.





Patient Interview:


Pt confirmed that she saw Dr. Young in his [place] office. Pt stated that 

Dr. Young took her boot off and confirmed that her ankle had re-fractured.


Pt was informed that she will have her bloodwork done soon and that she will 

soon be receiving pain meds


Pt confirmed that she has been wearing a boot for the past month.


Physical exam stable.


Pt confirmed that her Picc line was paced on 5/4/17


Pt confirmed that she had been receiving antibiotics two times a day with home 

health


Pt stated that she takes tramadol and muscle relaxers at home, but these are 

unhelpful





Scribed by Tiny Santos under the direct supervision of Dr. Mars.


Date Seen


5/14/17


Subjective/Events-last exam


Mrs. Rosario reports intermittent sharp pain about the right ankle is doing well 

otherwise.  She denies chest pain or shortness of breath.





Objective


Exam


Vital Signs





Vital Sign - Last 12Hours








 5/11/17 5/12/17





 12:00 16:00


 


Temp 98.0 


 


Pulse 86 


 


Resp 20 


 


B/P (MAP) 112/78 


 


Pulse Ox 97 


 


O2 Delivery Room Air 


 


O2 Flow Rate  1.00





Capillary Refill : Less Than 3 Seconds


General Appearance:  No Apparent Distress, Anxious, Chronically ill, Obese


Respiratory:  Chest Non Tender, Lungs Clear, Normal Breath Sounds, No Accessory 

Muscle Use, No Respiratory Distress


Cardiovascular:  Regular Rate, Rhythm, No Edema, No Gallop, No JVD, No Murmur, 

Normal Peripheral Pulses


Extremity:  Other (able to wiggle toes which are warm with normal capillary 

refill. Maurice amount of postop swelling right lower extremity calf nontender)





Assessment/Plan


Assessment and Plan


Assess & Plan/Chief Complaint


1.  Charcot deformity right foot with recurrent fracture status post external 

fixation continue anti-inflammatory and narcotic pain medication.


2. reported osteomyelitis right foot continue IV antibiotics.











ADINA BOJORQUEZ MD May 14, 2017 09:19

## 2017-05-14 NOTE — PODIATRY PROGRESS NOTE
Standard Progress Note


Progress Notes/Assess & Plan


Progress/Assessment & Plan


Pt seen at BS, NAD, denies F/C/N/V. denies SOB/CP





RLE- dressing changed, moderate sanguinous drainage, wound edges well coapted, 

sutures intact, exfix intact stable, no erythema, mild edema, N/V intact


Final Diagnosis


POD #2 Removal of Hardware and ExFix RLE





-Cultures currently no growth


-Cont NWB RLE


-If cultures continued as no growth okay for D/C without antibiotics to rehab


-Plan for definitive surgery as out patient once soft tissues allow for 

definitive fixation.











RUBA RODRÍGUEZ DPM May 14, 2017 18:03

## 2017-05-15 VITALS — DIASTOLIC BLOOD PRESSURE: 65 MMHG | SYSTOLIC BLOOD PRESSURE: 131 MMHG

## 2017-05-15 VITALS — DIASTOLIC BLOOD PRESSURE: 69 MMHG | SYSTOLIC BLOOD PRESSURE: 116 MMHG

## 2017-05-15 VITALS — DIASTOLIC BLOOD PRESSURE: 69 MMHG | SYSTOLIC BLOOD PRESSURE: 123 MMHG

## 2017-05-15 LAB
ANION GAP SERPL CALC-SCNC: 7 MMOL/L (ref 5–14)
BASOPHILS # BLD AUTO: 0 10^3/UL (ref 0–0.1)
BASOPHILS NFR BLD AUTO: 1 % (ref 0–10)
BUN SERPL-MCNC: 17 MG/DL (ref 7–18)
BUN/CREAT SERPL: 10
CALCIUM SERPL-MCNC: 8.4 MG/DL (ref 8.5–10.1)
CHLORIDE SERPL-SCNC: 112 MMOL/L (ref 98–107)
CO2 SERPL-SCNC: 24 MMOL/L (ref 21–32)
CREAT SERPL-MCNC: 1.62 MG/DL (ref 0.6–1.3)
EOSINOPHIL # BLD AUTO: 0.3 10^3/UL (ref 0–0.3)
EOSINOPHIL NFR BLD AUTO: 5 % (ref 0–10)
ERYTHROCYTE [DISTWIDTH] IN BLOOD BY AUTOMATED COUNT: 13.9 % (ref 10–14.5)
ERYTHROCYTE [SEDIMENTATION RATE] IN BLOOD: 53 MM/HR (ref 0–30)
GFR SERPLBLD BASED ON 1.73 SQ M-ARVRAT: 33 ML/MIN
GLUCOSE SERPL-MCNC: 97 MG/DL (ref 70–105)
LYMPHOCYTES # BLD AUTO: 1.7 X 10^3 (ref 1–4)
LYMPHOCYTES NFR BLD AUTO: 29 % (ref 12–44)
MCH RBC QN AUTO: 27 PG (ref 25–34)
MCHC RBC AUTO-ENTMCNC: 30 G/DL (ref 32–36)
MCV RBC AUTO: 88 FL (ref 80–99)
MONOCYTES # BLD AUTO: 0.5 X 10^3 (ref 0–1)
MONOCYTES NFR BLD AUTO: 9 % (ref 0–12)
NEUTROPHILS # BLD AUTO: 3.4 X 10^3 (ref 1.8–7.8)
NEUTROPHILS NFR BLD AUTO: 57 % (ref 42–75)
PLATELET # BLD: 192 10^3/UL (ref 130–400)
PMV BLD AUTO: 9.5 FL (ref 7.4–10.4)
POTASSIUM SERPL-SCNC: 3.8 MMOL/L (ref 3.6–5)
RBC # BLD AUTO: 3.28 10^6/UL (ref 4.35–5.85)
SODIUM SERPL-SCNC: 143 MMOL/L (ref 135–145)
WBC # BLD AUTO: 6 10^3/UL (ref 4.3–11)

## 2017-05-15 RX ADMIN — OXYBUTYNIN CHLORIDE SCH MG: 5 TABLET ORAL at 09:07

## 2017-05-15 RX ADMIN — SIMVASTATIN SCH MG: 10 TABLET, FILM COATED ORAL at 21:39

## 2017-05-15 RX ADMIN — VANCOMYCIN HYDROCHLORIDE SCH MLS/HR: 500 INJECTION, POWDER, LYOPHILIZED, FOR SOLUTION INTRAVENOUS at 19:29

## 2017-05-15 RX ADMIN — Medication SCH ML: at 06:18

## 2017-05-15 RX ADMIN — LACTULOSE SCH GM: 20 SOLUTION ORAL at 10:30

## 2017-05-15 RX ADMIN — NAPROXEN SCH MG: 250 TABLET ORAL at 16:32

## 2017-05-15 RX ADMIN — TOPIRAMATE SCH MG: 100 TABLET, FILM COATED ORAL at 07:43

## 2017-05-15 RX ADMIN — IMIPRAMINE HYDROCHLORIDE SCH MG: 25 TABLET ORAL at 21:39

## 2017-05-15 RX ADMIN — OXYBUTYNIN CHLORIDE SCH MG: 5 TABLET ORAL at 21:47

## 2017-05-15 RX ADMIN — UMECLIDINIUM SCH INH: 62.5 AEROSOL, POWDER ORAL at 09:03

## 2017-05-15 RX ADMIN — DULOXETINE HYDROCHLORIDE SCH MG: 30 CAPSULE, DELAYED RELEASE ORAL at 09:07

## 2017-05-15 RX ADMIN — Medication SCH ML: at 13:39

## 2017-05-15 RX ADMIN — FLUTICASONE PROPIONATE AND SALMETEROL XINAFOATE SCH PUFF: 115; 21 AEROSOL, METERED RESPIRATORY (INHALATION) at 09:03

## 2017-05-15 RX ADMIN — OXYCODONE HYDROCHLORIDE AND ACETAMINOPHEN PRN TAB: 10; 325 TABLET ORAL at 13:39

## 2017-05-15 RX ADMIN — LACTULOSE SCH GM: 20 SOLUTION ORAL at 21:39

## 2017-05-15 RX ADMIN — Medication SCH ML: at 21:40

## 2017-05-15 RX ADMIN — NAPROXEN SCH MG: 250 TABLET ORAL at 06:19

## 2017-05-15 RX ADMIN — DOXEPIN HYDROCHLORIDE SCH MG: 25 CAPSULE ORAL at 21:47

## 2017-05-15 RX ADMIN — OXYCODONE HYDROCHLORIDE AND ACETAMINOPHEN PRN TAB: 10; 325 TABLET ORAL at 21:39

## 2017-05-15 RX ADMIN — ENOXAPARIN SODIUM SCH MG: 100 INJECTION SUBCUTANEOUS at 07:36

## 2017-05-15 RX ADMIN — SODIUM CHLORIDE SCH MLS/HR: 900 INJECTION INTRAVENOUS at 09:07

## 2017-05-15 RX ADMIN — LEVOTHYROXINE SODIUM SCH MCG: 100 TABLET ORAL at 06:19

## 2017-05-15 RX ADMIN — FLUTICASONE PROPIONATE AND SALMETEROL XINAFOATE SCH PUFF: 115; 21 AEROSOL, METERED RESPIRATORY (INHALATION) at 18:43

## 2017-05-15 RX ADMIN — OXYCODONE HYDROCHLORIDE AND ACETAMINOPHEN PRN TAB: 10; 325 TABLET ORAL at 07:43

## 2017-05-15 RX ADMIN — VANCOMYCIN HYDROCHLORIDE SCH MLS/HR: 500 INJECTION, POWDER, LYOPHILIZED, FOR SOLUTION INTRAVENOUS at 07:36

## 2017-05-15 RX ADMIN — TOPIRAMATE SCH MG: 100 TABLET, FILM COATED ORAL at 21:39

## 2017-05-15 NOTE — PROGRESS NOTE-HOSPITALIST
Progress Note


HPI/CC on Admission


CC: Recurrent right ankle fracture.





HPI: This is a 61 yoWF pt who presented to Dr. Young's office in [place] 

for a follow up on the previous right ankle fracture. Upon examination, Dr. Young found that the pt's ankle had refractured.





Patient Interview:


Pt confirmed that she saw Dr. Young in his [place] office. Pt stated that 

Dr. Young took her boot off and confirmed that her ankle had re-fractured.


Pt was informed that she will have her bloodwork done soon and that she will 

soon be receiving pain meds


Pt confirmed that she has been wearing a boot for the past month.


Physical exam stable.


Pt confirmed that her Picc line was paced on 5/4/17


Pt confirmed that she had been receiving antibiotics two times a day with home 

health


Pt stated that she takes tramadol and muscle relaxers at home, but these are 

unhelpful





Scribed by Tiny Santos under the direct supervision of Dr. Mars.





Progress Notes/Assess & Plan


Date Seen


5/15/17


Admission Dx/Process


Assessment:


Charcot ankle with recurrent fracture in need of removal of hardware and IV 

antibiotics with bone biopsy presumed osteomyelitis


COPD


Current smoker


Migraines


Hypothyroidism


Chronic pain


Diagonsis/Assessment & Plan


Pt doing well over the weekend


No BM for 3 days


No falls during hospital stay


Needs DC to NH on PT/OT





No fever, vital signs stable, pleasant, improved


Regular rate and rhythm, clear to auscultation bilaterally but diminished in 

the bases


No edema





Laboratory Tests


5/15/17 06:15














Assessment:


Charcot ankle with recurrent fracture in need of removal of hardware and IV 

antibiotics with bone biopsy presumed osteomyelitis placed on Vanc and Cefepime 

as ordered on 5/4/17 via home health per culture results s/p uncomplicated 

surgery


COPD


Current smoker


Migraines


Hypothyroidism


Chronic pain


CRI





Plan:


Reconciled home meds


Labs


Pain meds


Lovenox maintained


NH at DC Monday back to [facility] when arranged











NACHO MARS DO May 15, 2017 10:04

## 2017-05-16 VITALS — SYSTOLIC BLOOD PRESSURE: 126 MMHG | DIASTOLIC BLOOD PRESSURE: 69 MMHG

## 2017-05-16 VITALS — SYSTOLIC BLOOD PRESSURE: 136 MMHG | DIASTOLIC BLOOD PRESSURE: 61 MMHG

## 2017-05-16 LAB
ALBUMIN SERPL-MCNC: 3.3 G/DL (ref 3.2–4.5)
ALT SERPL-CCNC: 14 U/L (ref 0–55)
ANION GAP SERPL CALC-SCNC: 8 MMOL/L (ref 5–14)
AST SERPL-CCNC: 16 U/L (ref 5–34)
BASOPHILS # BLD AUTO: 0 10^3/UL (ref 0–0.1)
BASOPHILS NFR BLD AUTO: 1 % (ref 0–10)
BILIRUB SERPL-MCNC: 0.3 MG/DL (ref 0.1–1)
BUN SERPL-MCNC: 21 MG/DL (ref 7–18)
BUN/CREAT SERPL: 8
CALCIUM SERPL-MCNC: 9 MG/DL (ref 8.5–10.1)
CHLORIDE SERPL-SCNC: 111 MMOL/L (ref 98–107)
CO2 SERPL-SCNC: 24 MMOL/L (ref 21–32)
CREAT SERPL-MCNC: 2.56 MG/DL (ref 0.6–1.3)
EOSINOPHIL # BLD AUTO: 0.3 10^3/UL (ref 0–0.3)
EOSINOPHIL NFR BLD AUTO: 5 % (ref 0–10)
ERYTHROCYTE [DISTWIDTH] IN BLOOD BY AUTOMATED COUNT: 13.8 % (ref 10–14.5)
GFR SERPLBLD BASED ON 1.73 SQ M-ARVRAT: 19 ML/MIN
GLUCOSE SERPL-MCNC: 122 MG/DL (ref 70–105)
LYMPHOCYTES # BLD AUTO: 1.3 X 10^3 (ref 1–4)
LYMPHOCYTES NFR BLD AUTO: 20 % (ref 12–44)
MCH RBC QN AUTO: 27 PG (ref 25–34)
MCHC RBC AUTO-ENTMCNC: 31 G/DL (ref 32–36)
MCV RBC AUTO: 87 FL (ref 80–99)
MONOCYTES # BLD AUTO: 0.6 X 10^3 (ref 0–1)
MONOCYTES NFR BLD AUTO: 9 % (ref 0–12)
NEUTROPHILS # BLD AUTO: 4.1 X 10^3 (ref 1.8–7.8)
NEUTROPHILS NFR BLD AUTO: 66 % (ref 42–75)
PLATELET # BLD: 213 10^3/UL (ref 130–400)
PMV BLD AUTO: 8.9 FL (ref 7.4–10.4)
POTASSIUM SERPL-SCNC: 3.6 MMOL/L (ref 3.6–5)
PROT SERPL-MCNC: 6.3 G/DL (ref 6.4–8.2)
RBC # BLD AUTO: 3.53 10^6/UL (ref 4.35–5.85)
SODIUM SERPL-SCNC: 143 MMOL/L (ref 135–145)
WBC # BLD AUTO: 6.3 10^3/UL (ref 4.3–11)

## 2017-05-16 RX ADMIN — OXYCODONE HYDROCHLORIDE AND ACETAMINOPHEN PRN TAB: 10; 325 TABLET ORAL at 06:21

## 2017-05-16 RX ADMIN — FLUTICASONE PROPIONATE AND SALMETEROL XINAFOATE SCH PUFF: 115; 21 AEROSOL, METERED RESPIRATORY (INHALATION) at 09:31

## 2017-05-16 RX ADMIN — LEVOTHYROXINE SODIUM SCH MCG: 100 TABLET ORAL at 06:18

## 2017-05-16 RX ADMIN — TOPIRAMATE SCH MG: 100 TABLET, FILM COATED ORAL at 07:50

## 2017-05-16 RX ADMIN — UMECLIDINIUM SCH INH: 62.5 AEROSOL, POWDER ORAL at 09:31

## 2017-05-16 RX ADMIN — NAPROXEN SCH MG: 250 TABLET ORAL at 06:18

## 2017-05-16 RX ADMIN — FENTANYL CITRATE PRN MCG: 50 INJECTION, SOLUTION INTRAMUSCULAR; INTRAVENOUS at 02:10

## 2017-05-16 RX ADMIN — Medication SCH ML: at 06:17

## 2017-05-16 RX ADMIN — Medication SCH ML: at 11:06

## 2017-05-16 RX ADMIN — ENOXAPARIN SODIUM SCH MG: 100 INJECTION SUBCUTANEOUS at 07:50

## 2017-05-16 RX ADMIN — DULOXETINE HYDROCHLORIDE SCH MG: 30 CAPSULE, DELAYED RELEASE ORAL at 07:50

## 2017-05-16 RX ADMIN — OXYBUTYNIN CHLORIDE SCH MG: 5 TABLET ORAL at 07:49

## 2017-05-16 RX ADMIN — LACTULOSE SCH GM: 20 SOLUTION ORAL at 07:50

## 2017-05-16 NOTE — DISCHARGE SUMMARY-HOSPITALIST
Diagnosis/Chief Complaint


Date of Admission


May 11, 2017 at 12:05


Date of Discharge





Admission Diagnosis


Assessment:


Charcot ankle with recurrent fracture in need of removal of hardware and IV 

antibiotics with bone biopsy presumed osteomyelitis


COPD


Current smoker


Migraines


Hypothyroidism


Chronic pain





Discharge Diagnosis


Assessment:


Charcot ankle with recurrent fracture in need of removal of hardware and IV 

antibiotics with bone biopsy presumed osteomyelitis placed on Vanc and Cefepime 

as ordered on 17 via home health per culture results s/p uncomplicated 

surgery but DC abx due to no growth on culture


COPD


Current smoker


Migraines


Hypothyroidism


Chronic pain


CRI








Pt doing well over the weekend


No BM for 3 days


No falls during hospital stay


Needs DC to NH on PT/OT





No fever, vital signs stable, pleasant, improved


Regular rate and rhythm, clear to auscultation bilaterally but diminished in 

the bases


No edema





Laboratory Tests


5/15/17 06:15














Assessment:


Charcot ankle with recurrent fracture in need of removal of hardware and IV 

antibiotics with bone biopsy presumed osteomyelitis placed on Vanc and Cefepime 

as ordered on 17 via home health per culture results s/p uncomplicated 

surgery


COPD


Current smoker


Migraines


Hypothyroidism


Chronic pain


CRI





Plan:


Reconciled home meds


Labs


Pain meds


Lovenox maintained


NH at DC Monday back to [facility] when arranged








Reason Hospital Visit/Course


CC: Recurrent right ankle fracture.





HPI: This is a 61 yoWF pt who presented to Dr. Young's office in [place] 

for a follow up on the previous right ankle fracture. Upon examination, Dr. Young found that the pt's ankle had refractured.





Patient Interview:


Pt confirmed that she saw Dr. Young in his [place] office. Pt stated that 

Dr. Young took her boot off and confirmed that her ankle had re-fractured.


Pt was informed that she will have her bloodwork done soon and that she will 

soon be receiving pain meds


Pt confirmed that she has been wearing a boot for the past month.


Physical exam stable.


Pt confirmed that her Picc line was paced on 17


Pt confirmed that she had been receiving antibiotics two times a day with home 

health


Pt stated that she takes tramadol and muscle relaxers at home, but these are 

unhelpful





Scribed by Tiny Santos under the direct supervision of Dr. Mars.





Note from 17:


Patient doing well and ready to go to [facility].


No BM yet even after multiple meds


Pt denies any pain but we need to get bowels moving.


Vanc trough elevated due to increased creatinine so all abx will be DC since 

cultures were NGTD and then BMP will be checked tomorrow


Pt ready for DC and pain meds were provided





AFVSS, Pleasant, O x 3, improved, chronically ill


RRR, CTAB


No edema





Hospital course: patient had a lengthy hospital course. Hardware in right ankle 

was removed uncomplicated by Dr Young and empiric abx were initiated in case 

this was osteomyelitis but bone bx and cultures were negative so she did not 

necessitate abx on DC. Creatinine increase was related to the Vanc that was DC 

and will be closely followed since she has mild CRI baseline of 1.6. Pt was 

deemed stable for DC after SSE and laxatives given and Lactulose was maintained 

to prevent recurrence of constipation.





Discharge Summary


Discharge Physical Examination


Allergies:  


Coded Allergies:  


     codeine (Verified  Allergy, Mild, 17)


Vitals & I&Os





Vital Signs








  Date Time  Temp Pulse Resp B/P (MAP) Pulse Ox O2 Delivery O2 Flow Rate FiO2


 


17 09:32     96   


 


17 07:55 98.1 108 20 136/61    


 


5/15/17 09:00       2.00 


 


17 12:00      Room Air  











Hospital Course


Labs (last 24 hrs)


Laboratory Tests


5/15/17 18:55: Vancomycin Level Trough > 50.0*H


17 10:03: 


White Blood Count 6.3, Red Blood Count 3.53L, Hemoglobin 9.4L, Hematocrit 31L, 

Mean Corpuscular Volume 87, Mean Corpuscular Hemoglobin 27, Mean Corpuscular 

Hemoglobin Concent 31L, Red Cell Distribution Width 13.8, Platelet Count 213, 

Mean Platelet Volume 8.9, Neutrophils (%) (Auto) 66, Lymphocytes (%) (Auto) 20, 

Monocytes (%) (Auto) 9, Eosinophils (%) (Auto) 5, Basophils (%) (Auto) 1, 

Neutrophils # (Auto) 4.1, Lymphocytes # (Auto) 1.3, Monocytes # (Auto) 0.6, 

Eosinophils # (Auto) 0.3, Basophils # (Auto) 0.0, Sodium Level 143, Potassium 

Level 3.6, Chloride Level 111H, Carbon Dioxide Level 24, Anion Gap 8, Blood 

Urea Nitrogen 21H, Creatinine 2.56H, Estimat Glomerular Filtration Rate 19, BUN/

Creatinine Ratio 8, Glucose Level 122H, Calcium Level 9.0, Total Bilirubin 0.3, 

Aspartate Amino Transf (AST/SGOT) 16, Alanine Aminotransferase (ALT/SGPT) 14, 

Alkaline Phosphatase 117, Total Protein 6.3L, Albumin 3.3





Microbiology


17 Blood Culture - Preliminary, Resulted


          No growth


17 Gram Stain - Final, Complete


          


17 Anaerobic Culture - Final, Complete


          No growth


17 Surgical Culture - Final, Complete


          No growth


17 MRSA Screen - Final, Complete


          MRSA not isolated





Pending Labs


Laboratory Tests


17 10:03: 


White Blood Count 6.3, Red Blood Count 3.53, Hemoglobin 9.4, Hematocrit 31, 

Mean Corpuscular Volume 87, Mean Corpuscular Hemoglobin 27, Mean Corpuscular 

Hemoglobin Concent 31, Red Cell Distribution Width 13.8, Platelet Count 213, 

Mean Platelet Volume 8.9, Neutrophils (%) (Auto) 66, Lymphocytes (%) (Auto) 20, 

Monocytes (%) (Auto) 9, Eosinophils (%) (Auto) 5, Basophils (%) (Auto) 1, 

Neutrophils # (Auto) 4.1, Lymphocytes # (Auto) 1.3, Monocytes # (Auto) 0.6, 

Eosinophils # (Auto) 0.3, Basophils # (Auto) 0.0, Sodium Level 143, Potassium 

Level 3.6, Chloride Level 111, Carbon Dioxide Level 24, Anion Gap 8, Blood Urea 

Nitrogen 21, Creatinine 2.56, Estimat Glomerular Filtration Rate 19, BUN/

Creatinine Ratio 8, Glucose Level 122, Calcium Level 9.0, Total Bilirubin 0.3, 

Aspartate Amino Transf (AST/SGOT) 16, Alanine Aminotransferase (ALT/SGPT) 14, 

Alkaline Phosphatase 117, Total Protein 6.3, Albumin 3.3








Discharge


Home Medications:





Active Scripts


Active


Lactulose 20 Gm/30 Ml Solution 10 Gm PO BID 30 Days


Enoxaparin Sodium 40 Mg/0.4 Ml Syringe 40 Mg SC 0800 30 Days


Oxycodone-Acetaminophen 5-325 (Oxycodone HCl/Acetaminophen) 1 Each Tablet 1-2 

Tab PO Q6H PRN


Tramadol HCl 50 Mg Tablet 50 Mg PO Q8H PRN


Reported


Albuterol Sulfate 2.5 Mg/3 Ml Vial.neb 3 Ml IH EVERY 4-6 HOURS PRN


Tizanidine HCl 4 Mg Tablet 4 Mg PO Q8H


Duloxetine HCl 60 Mg Capsule.dr 60 Mg PO DAILY


     TAKES ALONG WITH DULOXETINE 30MG DAILY


     


Duloxetine HCl 30 Mg Capsule.dr 30 Mg PO DAILY


     TAKES ALONG WITH DULOXETINE 60MG DAILY


Spiriva (Tiotropium Bromide) 1 Inh Aerp 2 Puff IH DAILY


     LAST FILLED 17 #1 INHALER


Symbicort 160-4.5 Mcg Inhaler (Budesonide/Formoterol Fumarate) 10.2 Gm 

Hfa.aer.ad 2 Puff IH BID PRN


Simvastatin 10 Mg Tablet 10 Mg PO HS


Levothyroxine Sodium 100 Mcg Tablet 100 Mcg PO DAILY


Doxepin HCl 50 Mg Capsule 50 Mg PO HS


     LAST FILLED 17 #30


Naproxen 500 Mg Tablet 500 Mg PO BID


Topiramate 100 Mg Tablet 100 Mg PO BID


Oxybutynin Chloride ER (Oxybutynin Chloride) 10 Mg Tab.er.24 10 Mg PO HS


     LAST FILLED 17 #30


Imipramine HCl 25 Mg Tablet 25 Mg PO HS


Desmopressin Acetate 0.2 Mg Tablet 0.6 Mg PO HS


     TAKES 3 (0.2 MG) TABLETS / LAST FILLED 17 #90





Instructions to patient/family


Please see electonic discharge instructions given to patient.





Clinical Quality Measures


DVT/VTE Risk/Contraindication:


Risk Factor Score Per Nursin


RFS Level Per Nursing on Admit:  4+=Very High


Contraindications-Pharm:  Other *list below*


Other:  


OR tomorrow











NACHO MARS DO May 16, 2017 09:54

## 2017-05-31 ENCOUNTER — HOSPITAL ENCOUNTER (OUTPATIENT)
Dept: HOSPITAL 75 - PREOP | Age: 62
End: 2017-05-31
Attending: PODIATRIST
Payer: MEDICAID

## 2017-05-31 VITALS — WEIGHT: 293 LBS | HEIGHT: 64 IN | BODY MASS INDEX: 50.02 KG/M2

## 2017-05-31 DIAGNOSIS — M14.671: ICD-10-CM

## 2017-05-31 DIAGNOSIS — Z01.818: Primary | ICD-10-CM

## 2017-06-09 ENCOUNTER — HOSPITAL ENCOUNTER (OUTPATIENT)
Dept: HOSPITAL 75 - SDC | Age: 62
LOS: 1 days | Discharge: NURSING, REHAB OR LONG-TERM CARE | End: 2017-06-10
Attending: PODIATRIST
Payer: MEDICAID

## 2017-06-09 VITALS — BODY MASS INDEX: 50.02 KG/M2 | HEIGHT: 64 IN | WEIGHT: 293 LBS

## 2017-06-09 VITALS — SYSTOLIC BLOOD PRESSURE: 164 MMHG | DIASTOLIC BLOOD PRESSURE: 87 MMHG

## 2017-06-09 VITALS — SYSTOLIC BLOOD PRESSURE: 160 MMHG | DIASTOLIC BLOOD PRESSURE: 91 MMHG

## 2017-06-09 DIAGNOSIS — M14.671: Primary | ICD-10-CM

## 2017-06-09 DIAGNOSIS — F32.9: ICD-10-CM

## 2017-06-09 DIAGNOSIS — F17.210: ICD-10-CM

## 2017-06-09 DIAGNOSIS — J44.9: ICD-10-CM

## 2017-06-09 DIAGNOSIS — Z79.899: ICD-10-CM

## 2017-06-09 PROCEDURE — 94664 DEMO&/EVAL PT USE INHALER: CPT

## 2017-06-09 PROCEDURE — 36415 COLL VENOUS BLD VENIPUNCTURE: CPT

## 2017-06-09 PROCEDURE — 85027 COMPLETE CBC AUTOMATED: CPT

## 2017-06-09 PROCEDURE — 94640 AIRWAY INHALATION TREATMENT: CPT

## 2017-06-09 PROCEDURE — 85610 PROTHROMBIN TIME: CPT

## 2017-06-09 PROCEDURE — 80048 BASIC METABOLIC PNL TOTAL CA: CPT

## 2017-06-09 RX ADMIN — CEFAZOLIN SCH MLS/HR: 10 INJECTION, POWDER, FOR SOLUTION INTRAVENOUS at 20:13

## 2017-06-09 RX ADMIN — MORPHINE SULFATE PRN MG: 10 INJECTION, SOLUTION INTRAMUSCULAR; INTRAVENOUS at 15:30

## 2017-06-09 RX ADMIN — MORPHINE SULFATE PRN MG: 10 INJECTION, SOLUTION INTRAMUSCULAR; INTRAVENOUS at 15:35

## 2017-06-09 RX ADMIN — DEXTROSE MONOHYDRATE AND SODIUM CHLORIDE SCH MLS/HR: 5; .45 INJECTION, SOLUTION INTRAVENOUS at 20:13

## 2017-06-09 NOTE — ANESTHESIA-PERIPHERAL NERVE BL
Procedure Start/Stop Time


Date of Procedure:  Jun 9, 2017


Start Time:  14:30


Stop Time:  14:50





Peripheral Nerve Block


Peripheral Nerve Blockade


Risk/Benefits/Alternatives discussed, including IV injection leading to 

complications or seizures, nerve irritation or damage, and/or bleeding.


Approach:  Popliteal


Side Confirmed:  RIGHT


Indication:  Analgesia


Specifically requested for management of pain by:


Dx/Pain Location


Postoperative





Patient Condition


Vital Signs





Vital Signs








  Date Time  Temp Pulse Resp B/P (MAP) Pulse Ox O2 Delivery O2 Flow Rate FiO2


 


6/9/17 11:36 98.1 92 16 164/87 95 Room Air  








Patient Condition:  General


PNB performed under:  General Anesthesia





Procedure


Prepartation:  Chlorhexidine


Position:  Prone


Needles:  Short-bevel


Needle (s) Size:  22g 2"


Technique:  Injection through needle, Nerve Stimulation


Motor response or parethesia o:  


toe flextion


mA:  0.8


Depth (cm):  4


Injectate:  ropivacaine


Concentration %:  0.5


Volume (ml):  30


Epinephrine used:  No





Narrative


Injection was made incrementally with constant monitoring.


Aspiration every   (mls):  5


Blood Aspirated:  No





Events


Events:  Difficult (due to leg size)


Sucess:  Complete


Patient Conditon Post Peripheral Nerve Block








Post Peripheral Nerve Block Vital Signs:





Blood Pressure:





   Systolic





   Diastolic





   Heart Rate


Blood Pressure Systolic:  164


Blood Pressure Diastolic:  87


Pulse Rate (adult):  92











LITZY STATON CRNA Jun 9, 2017 15:25

## 2017-06-09 NOTE — XMS REPORT
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                    4.3-11.0                

 

 Blood erythrocytes automated count (number/volume)                    4.75 10*6
/uL                    4.35-5.85                

 

 Venous blood hemoglobin measurement (mass/volume)                    13.6 g/dL
                    11.5-16.0                

 

 Blood hematocrit (volume fraction)                    42 %                    
35-52                

 

 Automated erythrocyte mean corpuscular volume                    87 [foz_us]  
                  80-99                

 

 Automated erythrocyte mean corpuscular hemoglobin (mass per erythrocyte)      
              29 pg                    25-34                

 

 Automated erythrocyte mean corpuscular hemoglobin concentration measurement (
mass/volume)                    33 g/dL                    32-36                

 

 Automated erythrocyte distribution width ratio                    13.7 %      
              10.0-14.5                

 

 Automated blood platelet count (count/volume)                    216 10*3/uL  
                  130-400                

 

 Automated blood platelet mean volume measurement                    9.5 [foz_us
]                    7.4-10.4                

 

 Automated blood neutrophils/100 leukocytes                    72 %            
        42-75                

 

 Automated blood lymphocytes/100 leukocytes                    20 %            
        12-44                

 

 Blood monocytes/100 leukocytes                    6 %                    0-12 
               

 

 Automated blood eosinophils/100 leukocytes                    2 %             
       0-10                

 

 Automated blood basophils/100 leukocytes                    0 %               
     0-10                

 

 Blood neutrophils automated count (number/volume)                    4.7 10*3 
                   1.8-7.8                

 

 Blood lymphocytes automated count (number/volume)                    1.3 10*3 
                   1.0-4.0                

 

 Blood monocytes automated count (number/volume)                    0.4 10*3   
                 0.0-1.0                

 

 Automated eosinophil count                    0.1 10*3/uL                    
0.0-0.3                

 

 Automated blood basophil count (count/volume)                    0.0 10*3/uL  
                  0.0-0.1                









 Comprehensive metabolic panel - 17 11:32                









 Serum or plasma sodium measurement (moles/volume)                    143 mmol/
L                    135-145                

 

 Serum or plasma potassium measurement (moles/volume)                    3.7 
mmol/L                    3.6-5.0                

 

 Serum or plasma chloride measurement (moles/volume)                    113 mmol
/L                                    

 

 Carbon dioxide                    20 mmol/L                    21-32          
      

 

 Serum or plasma anion gap determination (moles/volume)                    10 
mmol/L                    5-14                

 

 Serum or plasma urea nitrogen measurement (mass/volume)                    21 
mg/dL                    7-18                

 

 Serum or plasma creatinine measurement (mass/volume)                    0.81 mg
/dL                    0.60-1.30                

 

 Serum or plasma urea nitrogen/creatinine mass ratio                    26     
                NRG                

 

 Serum or plasma creatinine measurement with calculation of estimated 
glomerular filtration rate                    >                     NRG        
        

 

 Serum or plasma glucose measurement (mass/volume)                    107 mg/dL
                                    

 

 Serum or plasma calcium measurement (mass/volume)                    8.3 mg/dL
                    8.5-10.1                

 

 Serum or plasma total bilirubin measurement (mass/volume)                    
0.5 mg/dL                    0.1-1.0                

 

 Serum or plasma alkaline phosphatase measurement (enzymatic activity/volume)  
                  103 U/L                                    

 

 Serum or plasma aspartate aminotransferase measurement (enzymatic activity/
volume)                    21 U/L                    5-34                

 

 Serum or plasma alanine aminotransferase measurement (enzymatic activity/volume
)                    20 U/L                    0-55                

 

 Serum or plasma protein measurement (mass/volume)                    6.8 g/dL 
                   6.4-8.2                

 

 Serum or plasma albumin measurement (mass/volume)                    3.9 g/dL 
                   3.2-4.5                









 [id number] - 17 11:32                









 [id number]                    SPECIMEN AVAILABLE                     NRG         
       









 Complete urinalysis with reflex to culture - 17 12:25                









 Urine color determination                    YELLOW                     NRG   
             

 

 Urine clarity determination                    CLEAR                     NRG  
              

 

 Urine pH measurement by test strip                    5                     5-
9                

 

 Specific gravity of urine by test strip                    1.025              
       1.016-1.022                

 

 Urine protein assay by test strip, semi-quantitative                    1+    
                 NEGATIVE                

 

 Urine glucose detection by automated test strip                    NEGATIVE   
                  NEGATIVE                

 

 Erythrocytes detection in urine sediment by light microscopy                  
  NEGATIVE                     NEGATIVE                

 

 Urine ketones detection by automated test strip                    NEGATIVE   
                  NEGATIVE                

 

 Urine nitrite detection by test strip                    NEGATIVE             
        NEGATIVE                

 

 Urine total bilirubin detection by test strip                    NEGATIVE     
                NEGATIVE                

 

 Urine urobilinogen measurement by automated test strip (mass/volume)          
          NORMAL                     NORMAL                

 

 Urine leukocyte esterase detection by dipstick                    NEGATIVE    
                 NEGATIVE                

 

 Automated urine sediment erythrocyte count by microscopy (number/high power 
field)                    NONE                     NRG                

 

 Automated urine sediment leukocyte count by microscopy (number/high power field
)                    NONE                     NRG                

 

 Bacteria detection in urine sediment by light microscopy                    
TRACE                     NRG                

 

 Squamous epithelial cells detection in urine sediment by light microscopy     
               RARE                     NRG                

 

 Crystals detection in urine sediment by light microscopy                    
NONE                     NRG                

 

 Casts detection in urine sediment by light microscopy                    NONE 
                    NRG                

 

 Mucus detection in urine sediment by light microscopy                    
NEGATIVE                     NRG                

 

 Complete urinalysis with reflex to culture                    NO              
       NRG                









 Whole blood hemoglobin and hematocrit panel - 17 04:25                









 Venous blood hemoglobin measurement (mass/volume)                    12.2 g/dL
                    11.5-16.0                

 

 Blood hematocrit (volume fraction)                    37 %                    
35-52                









 Whole blood hemoglobin and hematocrit panel - 17 04:45                









 Venous blood hemoglobin measurement (mass/volume)                    11.6 g/dL
                    11.5-16.0                

 

 Blood hematocrit (volume fraction)                    36 %                    
35-52                









 Whole blood hemoglobin and hematocrit panel - 17 04:21                









 Venous blood hemoglobin measurement (mass/volume)                    11.7 g/dL
                    11.5-16.0                

 

 Blood hematocrit (volume fraction)                    36 %                    
35-52                









 Capillary blood glucose measurement by glucometer (mass/volume) - 17 05:
12                









 Capillary blood glucose measurement by glucometer (mass/volume)               
     104 mg/dL                                    









 Methicillin resistant Staphylococcus aureus (MRSA) screening culture -  05:30                









 Methicillin resistant Staphylococcus aureus (MRSA) screening culture          
          NEG                     NRG                









 Bacteria identification in isolate by anaerobe culture - 17 11:50       
         









 Bacteria identification in isolate by anaerobe culture                    
NOANA                     NRG                









 Gram stain microscopy - 17 11:50                









 GRAM STAIN RESULT                    FEW WBC'S, NO BACTERIA OBSERVED          
           NRG                









 Bacteria identification in wound by culture - 17 11:50                









 Bacteria identification in wound by culture                    [id number]       
              NRG                

 

 FREE TEXT EXTERNAL                    RML ID PER MALDI 17                 
    NRG                

 

 QUANTITY OF GROWTH                    Scant Growth                     NRG    
            

 

 FREE TEXT ENTRY 2                    RML REPORTED SENSITIVITY 17          
           NRG                









 Bacterial susceptibility panel - 17 11:50                









 Gentamicin susceptibility test by minimum inhibitory concentration            
        2                     NRG                

 

 Tobramycin susceptibility test by minimum inhibitory concentration            
        <=                    NRG                

 

 Piperacillin/tazobactam susceptibility test by minimum inhibitory 
concentration                    8                     NRG                

 

 Ciprofloxacin susceptibility test by minimum inhibitory concentration         
           <=                    NRG                

 

 Meropenem susceptibility test by minimum inhibitory concentration             
       <=                    NRG                

 

 Cefepime susceptibility test by minimum inhibitory concentration              
      2                     NRG                









 Complete blood count (CBC) with automated white blood cell (WBC) differential 
- 17 12:56                









 Blood leukocytes automated count (number/volume)                    5.7 10*3/
uL                    4.3-11.0                

 

 Blood erythrocytes automated count (number/volume)                    4.40 10*6
/uL                    4.35-5.85                

 

 Venous blood hemoglobin measurement (mass/volume)                    11.8 g/dL
                    11.5-16.0                

 

 Blood hematocrit (volume fraction)                    38 %                    
35-52                

 

 Automated erythrocyte mean corpuscular volume                    86 [foz_us]  
                  80-99                

 

 Automated erythrocyte mean corpuscular hemoglobin (mass per erythrocyte)      
              27 pg                    25-34                

 

 Automated erythrocyte mean corpuscular hemoglobin concentration measurement (
mass/volume)                    31 g/dL                    32-36                

 

 Automated erythrocyte distribution width ratio                    14.3 %      
              10.0-14.5                

 

 Automated blood platelet count (count/volume)                    244 10*3/uL  
                  130-400                

 

 Automated blood platelet mean volume measurement                    8.9 [foz_us
]                    7.4-10.4                

 

 Automated blood neutrophils/100 leukocytes                    66 %            
        42-75                

 

 Automated blood lymphocytes/100 leukocytes                    23 %            
        12-44                

 

 Blood monocytes/100 leukocytes                    7 %                    0-12 
               

 

 Automated blood eosinophils/100 leukocytes                    4 %             
       0-10                

 

 Automated blood basophils/100 leukocytes                    1 %               
     0-10                

 

 Blood neutrophils automated count (number/volume)                    3.7 10*3 
                   1.8-7.8                

 

 Blood lymphocytes automated count (number/volume)                    1.3 10*3 
                   1.0-4.0                

 

 Blood monocytes automated count (number/volume)                    0.4 10*3   
                 0.0-1.0                

 

 Automated eosinophil count                    0.2 10*3/uL                    
0.0-0.3                

 

 Automated blood basophil count (count/volume)                    0.1 10*3/uL  
                  0.0-0.1                









 Comprehensive metabolic panel - 17 12:56                









 Serum or plasma sodium measurement (moles/volume)                    138 mmol/
L                    135-145                

 

 Serum or plasma potassium measurement (moles/volume)                    4.0 
mmol/L                    3.6-5.0                

 

 Serum or plasma chloride measurement (moles/volume)                    105 mmol
/L                                    

 

 Carbon dioxide                    23 mmol/L                    21-32          
      

 

 Serum or plasma anion gap determination (moles/volume)                    10 
mmol/L                    5-14                

 

 Serum or plasma urea nitrogen measurement (mass/volume)                    16 
mg/dL                    7-18                

 

 Serum or plasma creatinine measurement (mass/volume)                    0.85 mg
/dL                    0.60-1.30                

 

 Serum or plasma urea nitrogen/creatinine mass ratio                    19     
                NRG                

 

 Serum or plasma creatinine measurement with calculation of estimated 
glomerular filtration rate                    >                     NRG        
        

 

 Serum or plasma glucose measurement (mass/volume)                    100 mg/dL
                                    

 

 Serum or plasma calcium measurement (mass/volume)                    8.9 mg/dL
                    8.5-10.1                

 

 Serum or plasma total bilirubin measurement (mass/volume)                    
0.4 mg/dL                    0.1-1.0                

 

 Serum or plasma alkaline phosphatase measurement (enzymatic activity/volume)  
                  147 U/L                                    

 

 Serum or plasma aspartate aminotransferase measurement (enzymatic activity/
volume)                    33 U/L                    5-34                

 

 Serum or plasma alanine aminotransferase measurement (enzymatic activity/volume
)                    29 U/L                    0-55                

 

 Serum or plasma protein measurement (mass/volume)                    7.1 g/dL 
                   6.4-8.2                

 

 Serum or plasma albumin measurement (mass/volume)                    3.9 g/dL 
                   3.2-4.5                









 Complete blood count (CBC) with automated white blood cell (WBC) differential 
- 17 12:34                









 Blood leukocytes automated count (number/volume)                    6.1 10*3/
uL                    4.3-11.0                

 

 Blood erythrocytes automated count (number/volume)                    4.16 10*6
/uL                    4.35-5.85                

 

 Venous blood hemoglobin measurement (mass/volume)                    11.1 g/dL
                    11.5-16.0                

 

 Blood hematocrit (volume fraction)                    36 %                    
35-52                

 

 Automated erythrocyte mean corpuscular volume                    86 [foz_us]  
                  80-99                

 

 Automated erythrocyte mean corpuscular hemoglobin (mass per erythrocyte)      
              27 pg                    25-34                

 

 Automated erythrocyte mean corpuscular hemoglobin concentration measurement (
mass/volume)                    31 g/dL                    32-36                

 

 Automated erythrocyte distribution width ratio                    13.7 %      
              10.0-14.5                

 

 Automated blood platelet count (count/volume)                    244 10*3/uL  
                  130-400                

 

 Automated blood platelet mean volume measurement                    9.4 [foz_us
]                    7.4-10.4                

 

 Automated blood neutrophils/100 leukocytes                    59 %            
        42-75                

 

 Automated blood lymphocytes/100 leukocytes                    27 %            
        12-44                

 

 Blood monocytes/100 leukocytes                    8 %                    0-12 
               

 

 Automated blood eosinophils/100 leukocytes                    5 %             
       0-10                

 

 Automated blood basophils/100 leukocytes                    1 %               
     0-10                

 

 Blood neutrophils automated count (number/volume)                    3.6 10*3 
                   1.8-7.8                

 

 Blood lymphocytes automated count (number/volume)                    1.7 10*3 
                   1.0-4.0                

 

 Blood monocytes automated count (number/volume)                    0.5 10*3   
                 0.0-1.0                

 

 Automated eosinophil count                    0.3 10*3/uL                    
0.0-0.3                

 

 Automated blood basophil count (count/volume)                    0.1 10*3/uL  
                  0.0-0.1                









 Blood lactic acid measurement (moles/volume) - 17 12:34                









 Blood lactic acid measurement (moles/volume)                    0.97 mmol/L   
                 0.50-2.00                









 Comprehensive metabolic panel - 17 12:34                









 Serum or plasma sodium measurement (moles/volume)                    140 mmol/
L                    135-145                

 

 Serum or plasma potassium measurement (moles/volume)                    3.7 
mmol/L                    3.6-5.0                

 

 Serum or plasma chloride measurement (moles/volume)                    110 mmol
/L                                    

 

 Carbon dioxide                    22 mmol/L                    21-32          
      

 

 Serum or plasma anion gap determination (moles/volume)                    8 
mmol/L                    5-14                

 

 Serum or plasma urea nitrogen measurement (mass/volume)                    14 
mg/dL                    7-18                

 

 Serum or plasma creatinine measurement (mass/volume)                    0.79 mg
/dL                    0.60-1.30                

 

 Serum or plasma urea nitrogen/creatinine mass ratio                    18     
                NRG                

 

 Serum or plasma creatinine measurement with calculation of estimated 
glomerular filtration rate                    >                     NRG        
        

 

 Serum or plasma glucose measurement (mass/volume)                    101 mg/dL
                                    

 

 Serum or plasma calcium measurement (mass/volume)                    8.7 mg/dL
                    8.5-10.1                

 

 Serum or plasma total bilirubin measurement (mass/volume)                    
0.3 mg/dL                    0.1-1.0                

 

 Serum or plasma alkaline phosphatase measurement (enzymatic activity/volume)  
                  134 U/L                                    

 

 Serum or plasma aspartate aminotransferase measurement (enzymatic activity/
volume)                    17 U/L                    5-34                

 

 Serum or plasma alanine aminotransferase measurement (enzymatic activity/volume
)                    15 U/L                    0-55                

 

 Serum or plasma protein measurement (mass/volume)                    6.5 g/dL 
                   6.4-8.2                

 

 Serum or plasma albumin measurement (mass/volume)                    3.6 g/dL 
                   3.2-4.5                









 Bacterial blood culture - 17 12:34                









 Bacterial blood culture                    NG                     NRG         
       









 Bacterial blood culture - 17 12:42                









 Bacterial blood culture                    NG                     NRG         
       









 Complete blood count (CBC) with automated white blood cell (WBC) differential 
- 17 05:56                









 Blood leukocytes automated count (number/volume)                    4.9 10*3/
uL                    4.3-11.0                

 

 Blood erythrocytes automated count (number/volume)                    4.02 10*6
/uL                    4.35-5.85                

 

 Venous blood hemoglobin measurement (mass/volume)                    10.9 g/dL
                    11.5-16.0                

 

 Blood hematocrit (volume fraction)                    35 %                    
35-52                

 

 Automated erythrocyte mean corpuscular volume                    86 [foz_us]  
                  80-99                

 

 Automated erythrocyte mean corpuscular hemoglobin (mass per erythrocyte)      
              27 pg                    25-34                

 

 Automated erythrocyte mean corpuscular hemoglobin concentration measurement (
mass/volume)                    31 g/dL                    32-36                

 

 Automated erythrocyte distribution width ratio                    13.9 %      
              10.0-14.5                

 

 Automated blood platelet count (count/volume)                    232 10*3/uL  
                  130-400                

 

 Automated blood platelet mean volume measurement                    9.9 [foz_us
]                    7.4-10.4                

 

 Automated blood neutrophils/100 leukocytes                    51 %            
        42-75                

 

 Automated blood lymphocytes/100 leukocytes                    34 %            
        12-44                

 

 Blood monocytes/100 leukocytes                    9 %                    0-12 
               

 

 Automated blood eosinophils/100 leukocytes                    5 %             
       0-10                

 

 Automated blood basophils/100 leukocytes                    1 %               
     0-10                

 

 Blood neutrophils automated count (number/volume)                    2.5 10*3 
                   1.8-7.8                

 

 Blood lymphocytes automated count (number/volume)                    1.7 10*3 
                   1.0-4.0                

 

 Blood monocytes automated count (number/volume)                    0.4 10*3   
                 0.0-1.0                

 

 Automated eosinophil count                    0.2 10*3/uL                    
0.0-0.3                

 

 Automated blood basophil count (count/volume)                    0.1 10*3/uL  
                  0.0-0.1                









 Comprehensive metabolic panel - 17 05:56                









 Serum or plasma sodium measurement (moles/volume)                    144 mmol/
L                    135-145                

 

 Serum or plasma potassium measurement (moles/volume)                    3.8 
mmol/L                    3.6-5.0                

 

 Serum or plasma chloride measurement (moles/volume)                    111 mmol
/L                                    

 

 Carbon dioxide                    27 mmol/L                    21-32          
      

 

 Serum or plasma anion gap determination (moles/volume)                    6 
mmol/L                    5-14                

 

 Serum or plasma urea nitrogen measurement (mass/volume)                    12 
mg/dL                    7-18                

 

 Serum or plasma creatinine measurement (mass/volume)                    0.78 mg
/dL                    0.60-1.30                

 

 Serum or plasma urea nitrogen/creatinine mass ratio                    15     
                NRG                

 

 Serum or plasma creatinine measurement with calculation of estimated 
glomerular filtration rate                    >                     NRG        
        

 

 Serum or plasma glucose measurement (mass/volume)                    101 mg/dL
                                    

 

 Serum or plasma calcium measurement (mass/volume)                    8.8 mg/dL
                    8.5-10.1                

 

 Serum or plasma total bilirubin measurement (mass/volume)                    
0.3 mg/dL                    0.1-1.0                

 

 Serum or plasma alkaline phosphatase measurement (enzymatic activity/volume)  
                  126 U/L                                    

 

 Serum or plasma aspartate aminotransferase measurement (enzymatic activity/
volume)                    18 U/L                    5-34                

 

 Serum or plasma alanine aminotransferase measurement (enzymatic activity/volume
)                    14 U/L                    0-55                

 

 Serum or plasma protein measurement (mass/volume)                    6.3 g/dL 
                   6.4-8.2                

 

 Serum or plasma albumin measurement (mass/volume)                    3.5 g/dL 
                   3.2-4.5                









 Methicillin resistant Staphylococcus aureus (MRSA) screening culture -  05:56                









 Methicillin resistant Staphylococcus aureus (MRSA) screening culture          
          NEG                     NRG                









 Bacteria identification in isolate by anaerobe culture - 17 13:50       
         









 Bacteria identification in isolate by anaerobe culture                    NG  
                   NRG                









 Gram stain microscopy - 17 13:50                









 GRAM STAIN RESULT                    FEW WBC'S, NO BACTERIA OBSERVED          
           NRG                









 Bacteria identification in wound by culture - 17 13:50                









 Bacteria identification in wound by culture                    NG             
        NRG                









 Vancomycin trough - 05/15/17 18:55                









 Vancomycin trough                    > ug/mL                    10.0-20.0     
           









 Complete blood count (CBC) with automated white blood cell (WBC) differential 
- 17 10:03                









 Blood leukocytes automated count (number/volume)                    6.3 10*3/
uL                    4.3-11.0                

 

 Blood erythrocytes automated count (number/volume)                    3.53 10*6
/uL                    4.35-5.85                

 

 Venous blood hemoglobin measurement (mass/volume)                    9.4 g/dL 
                   11.5-16.0                

 

 Blood hematocrit (volume fraction)                    31 %                    
35-52                

 

 Automated erythrocyte mean corpuscular volume                    87 [foz_us]  
                  80-99                

 

 Automated erythrocyte mean corpuscular hemoglobin (mass per erythrocyte)      
              27 pg                    25-34                

 

 Automated erythrocyte mean corpuscular hemoglobin concentration measurement (
mass/volume)                    31 g/dL                    32-36                

 

 Automated erythrocyte distribution width ratio                    13.8 %      
              10.0-14.5                

 

 Automated blood platelet count (count/volume)                    213 10*3/uL  
                  130-400                

 

 Automated blood platelet mean volume measurement                    8.9 [foz_us
]                    7.4-10.4                

 

 Automated blood neutrophils/100 leukocytes                    66 %            
        42-75                

 

 Automated blood lymphocytes/100 leukocytes                    20 %            
        12-44                

 

 Blood monocytes/100 leukocytes                    9 %                    0-12 
               

 

 Automated blood eosinophils/100 leukocytes                    5 %             
       0-10                

 

 Automated blood basophils/100 leukocytes                    1 %               
     0-10                

 

 Blood neutrophils automated count (number/volume)                    4.1 10*3 
                   1.8-7.8                

 

 Blood lymphocytes automated count (number/volume)                    1.3 10*3 
                   1.0-4.0                

 

 Blood monocytes automated count (number/volume)                    0.6 10*3   
                 0.0-1.0                

 

 Automated eosinophil count                    0.3 10*3/uL                    
0.0-0.3                

 

 Automated blood basophil count (count/volume)                    0.0 10*3/uL  
                  0.0-0.1                









 Comprehensive metabolic panel - 17 10:03                









 Serum or plasma sodium measurement (moles/volume)                    143 mmol/
L                    135-145                

 

 Serum or plasma potassium measurement (moles/volume)                    3.6 
mmol/L                    3.6-5.0                

 

 Serum or plasma chloride measurement (moles/volume)                    111 mmol
/L                                    

 

 Carbon dioxide                    24 mmol/L                    21-32          
      

 

 Serum or plasma anion gap determination (moles/volume)                    8 
mmol/L                    5-14                

 

 Serum or plasma urea nitrogen measurement (mass/volume)                    21 
mg/dL                    7-18                

 

 Serum or plasma creatinine measurement (mass/volume)                    2.56 mg
/dL                    0.60-1.30                

 

 Serum or plasma urea nitrogen/creatinine mass ratio                    8      
               NRG                

 

 Serum or plasma creatinine measurement with calculation of estimated 
glomerular filtration rate                    19                     NRG       
         

 

 Serum or plasma glucose measurement (mass/volume)                    122 mg/dL
                                    

 

 Serum or plasma calcium measurement (mass/volume)                    9.0 mg/dL
                    8.5-10.1                

 

 Serum or plasma total bilirubin measurement (mass/volume)                    
0.3 mg/dL                    0.1-1.0                

 

 Serum or plasma alkaline phosphatase measurement (enzymatic activity/volume)  
                  117 U/L                                    

 

 Serum or plasma aspartate aminotransferase measurement (enzymatic activity/
volume)                    16 U/L                    5-34                

 

 Serum or plasma alanine aminotransferase measurement (enzymatic activity/volume
)                    14 U/L                    0-55                

 

 Serum or plasma protein measurement (mass/volume)                    6.3 g/dL 
                   6.4-8.2                

 

 Serum or plasma albumin measurement (mass/volume)                    3.3 g/dL 
                   3.2-4.5                









 PT panel in platelet poor plasma by coagulation assay - 17 12:35        
        









 Prothrombin time (PT) in platelet poor plasma by coagulation assay            
        12.5 s                    12.2-14.7                

 

 INR in platelet poor plasma or blood by coagulation assay                    
1.0                     0.8-1.4                









 Activated partial thromboplastin time (aPTT) in platelet poor plasma 
bycoagulation assay - 17 12:35                









 Activated partial thromboplastin time (aPTT) in platelet poor plasma 
bycoagulation assay                    30 s                    24-35           
     









 Methicillin resistant Staphylococcus aureus (MRSA) screening culture -  12:45                









 Methicillin resistant Staphylococcus aureus (MRSA) screening culture          
          NEG                     NRG                



                                                                               
                                                                               
                                                                               
                                                                               
                                                                        



Encounters

                      





 ACCT No.                    Visit Date/Time                    Discharge      
              Status                    Pt. Type                    Provider   
                 Facility                    Loc./Unit                    
Complaint                

 

 [id number]                    2017 13:00:00                    2017 13:00:00                    CAN                    Preadmit               
     RUBA RODRÍGUEZ DPM                    [facility]                    CHARCOT DEFORMITY RIGHT ANKLE         
       

 

 [id number]                    2017 13:27:00                    2017 14:14:00                    DIS                    Outpatient             
       RUBA RODRÍGUEZ DPM                    [facility]
                    PREOP                    REV. EXFIX,RIGHT ANKLE FUSION     
           

 

 [id number]                    2017 10:12:00                    2017 16:00:00                    DIS                    Outpatient             
       EBONY CHEN MD                    [facility] 
                   WOUNDCARE                    WOUND                

 

 [id number]                    2017 12:05:00                    2017 14:35:00                    DIS                    Inpatient              
      NACHO MONTOYA DO                    [facility]     
               4TH                    ANKLE FRACTURE                

 

 [id number]                    2017 16:45:00                    2017 13:00:00                    DIS                    Inpatient              
      LUMA RAMAN MD                    [facility] 
                   4TH                    RIGHT ANKLE FRACTURE                

 

 [id number]                    2017 12:00:00                    2017 12:00:00                    CAN                    Preadmit               
     RUBA RODRÍGUEZ DPM                    [facility]  
                  PREOP                    RT ANKLE FX                

 

 [id number]                    2015 12:48:00                    2015 23:59:59                    CLS                    Outpatient             
       JAYLENE PIPER MD                    [facility]
                    RAD                    SCREENING                

 

 [id number]                    2015 10:27:00                    2015 14:25:00                    DIS                    Outpatient             
       MAUREEN PENG DO                    [facility]                    SCREENING                

 

 [id number]                    2015 07:31:00                    2015 23:59:59                    CLS                    Outpatient             
       MAUREEN PENG DO                    [facility]                    PREOP                    SCREENING                

 

 [id number]                    2014 15:06:00                    2014 23:59:59                    CLS                    Outpatient             
       JAYLENE PIPER MD                    [facility]
                    RAD                    SCREENING                

 

 [id number]                    10/03/2013 13:06:00                    10/03/
2013 23:59:59                    CLS                    Outpatient             
       JAYLENE PIPER MD                    [facility]
                    RT                    COPD, PREOP EVAL                

 

 [id number]                    2013 09:54:00                    2013 23:59:59                    CLS                    Outpatient             
       JAYLENE PIPER MD                    [facility]
                    RAD                    SCREENING                

 

 [id number]                    2013 20:55:00                    2013 22:17:00                    DIS                    Emergency              
      JILL RIVAS MD                    [facility]   
                 ER                    SOA                

 

 [id number]                    2017 08:00:00                           
              PEN                    Preadmit                    JAYLENE PIPER MD                    [facility]                    
RAD                    ACUTE RENAL INJURY FOLLOWING VANCOMYCIN                

 

 [id number]                    2017 10:32:00                           
              ACT                    Outpatient                    RUBA RODRÍGUEZ DPM                    [facility]                    CHARCOT DEFORMITY RIGHT ANKLE                

 

 [id number]                    2017 13:13:00                           
              ACT                    Outpatient                    EBONY CHEN MD                    [facility]                    [phone]                

 

 [id number]                    2017 12:25:00                           
              ACT                    Outpatient                    EBONY CHEN MD                    [facility]                  
  RAD                    LYMPHEDEMA,NON PRESSURE CHRONIC ULCER OF RT ANKLE     
           

 

 [id number]                    2016 11:49:00                           
              ACT                    Outpatient                    JAYLENE PIPER MD                    [facility]                    
RT                    COPD                

 

 [id number]                    2015 13:51:00                           
                                   Document Registration                       
                                                                             

 

 [id number]                    2015 13:51:00                           
                                   Document Registration                       
                                                                             

 

 [id number]                    2012 20:08:00                           
                                   Document Registration                       
                                                                             

 

 [id number]                    2012 00:50:00                           
                                   Document Registration                       
                                                                             

 

 [id number]                    2012 14:32:00                           
                                   Document Registration                       
                                                                             

 

 [id number]                    2011 12:43:00                           
                                   Document Registration                       
                                                                             

 

 [id number]                    2011 20:14:00                           
                                   Document Registration                       
                                                                             

 

 [id number]                    2011 20:32:00                           
                                   Document Registration                       
                                                                             

 

 [id number]                    2010 09:33:00                           
                                   Document Registration                       
                                                                             

 

 [id number]                    2010 14:27:00                           
                                   Document Registration                       
                                                                             

 

 [id number]                    2010 09:29:00                           
                                   Document Registration                       
                                                                             

 

 [id number]                    03/15/2010 10:41:00                           
                                   Document Registration

## 2017-06-09 NOTE — DIAGNOSTIC IMAGING REPORT
INDICATION: Subtalar joint effusion of the right ankle.



FINDINGS / IMPRESSION:



120.5 seconds of fluoroscopy was used by Dr. Young during a

right ankle subtalar joint effusion. Digital images during

surgery were obtained showing placement of an intramedullary sharon

through the calcaneus and talus into the distal tibia shaft with

screw anchors proximally and distally.



Dictated by: 



  Dictated on workstation # [id number]

## 2017-06-10 VITALS — SYSTOLIC BLOOD PRESSURE: 139 MMHG | DIASTOLIC BLOOD PRESSURE: 83 MMHG

## 2017-06-10 VITALS — SYSTOLIC BLOOD PRESSURE: 125 MMHG | DIASTOLIC BLOOD PRESSURE: 77 MMHG

## 2017-06-10 VITALS — SYSTOLIC BLOOD PRESSURE: 125 MMHG | DIASTOLIC BLOOD PRESSURE: 78 MMHG

## 2017-06-10 LAB
ANION GAP SERPL CALC-SCNC: 11 MMOL/L (ref 5–14)
BUN SERPL-MCNC: 16 MG/DL (ref 7–18)
BUN/CREAT SERPL: 12
CALCIUM SERPL-MCNC: 8.2 MG/DL (ref 8.5–10.1)
CHLORIDE SERPL-SCNC: 111 MMOL/L (ref 98–107)
CO2 SERPL-SCNC: 19 MMOL/L (ref 21–32)
CREAT SERPL-MCNC: 1.29 MG/DL (ref 0.6–1.3)
ERYTHROCYTE [DISTWIDTH] IN BLOOD BY AUTOMATED COUNT: 14.1 % (ref 10–14.5)
GFR SERPLBLD BASED ON 1.73 SQ M-ARVRAT: 42 ML/MIN
GLUCOSE SERPL-MCNC: 126 MG/DL (ref 70–105)
INR PPP: 1.1 (ref 0.8–1.4)
MCH RBC QN AUTO: 26 PG (ref 25–34)
MCHC RBC AUTO-ENTMCNC: 31 G/DL (ref 32–36)
MCV RBC AUTO: 84 FL (ref 80–99)
PLATELET # BLD: 183 10^3/UL (ref 130–400)
PMV BLD AUTO: 10 FL (ref 7.4–10.4)
POTASSIUM SERPL-SCNC: 3.6 MMOL/L (ref 3.6–5)
PROTHROMBIN TIME: 13.6 SEC (ref 12.2–14.7)
RBC # BLD AUTO: 3.42 10^6/UL (ref 4.35–5.85)
SODIUM SERPL-SCNC: 141 MMOL/L (ref 135–145)
WBC # BLD AUTO: 7.3 10^3/UL (ref 4.3–11)

## 2017-06-10 RX ADMIN — CEFAZOLIN SCH MLS/HR: 10 INJECTION, POWDER, FOR SOLUTION INTRAVENOUS at 03:47

## 2017-06-10 RX ADMIN — HYDROCODONE BITARTRATE AND ACETAMINOPHEN PRN EA: 10; 325 TABLET ORAL at 13:34

## 2017-06-10 RX ADMIN — MORPHINE SULFATE PRN MG: 10 INJECTION, SOLUTION INTRAMUSCULAR; INTRAVENOUS at 04:08

## 2017-06-10 RX ADMIN — DEXTROSE MONOHYDRATE AND SODIUM CHLORIDE SCH MLS/HR: 5; .45 INJECTION, SOLUTION INTRAVENOUS at 06:20

## 2017-06-10 RX ADMIN — MORPHINE SULFATE PRN MG: 10 INJECTION, SOLUTION INTRAMUSCULAR; INTRAVENOUS at 09:38

## 2017-06-10 RX ADMIN — HYDROCODONE BITARTRATE AND ACETAMINOPHEN PRN EA: 10; 325 TABLET ORAL at 08:58

## 2017-06-10 NOTE — PROGRESS NOTE (SOAP)
Subjective


Date Seen by Provider:  Alvarado 10, 2017


Time Seen by Provider:  10:40


Subjective/Events-last exam


POD #1 s/p right ex-fix removal and ORIF right ankle.  Patients pain is 

controlled and she is doing well.  Denies new symptoms today.


Review of Systems


General:  No Chills, No Night Sweats


HEENT:  No Head Aches, No Visual Changes


Pulmonary:  No Dyspnea, No Cough


Cardiovascular:  No: Chest Pain, Palpitations


Gastrointestinal:  No: Nausea, Vomiting


Musculoskeletal:  leg pain


Neurological:  No: Numbness, Weakness





Objective


Exam





Vital Signs








  Date Time  Temp Pulse Resp B/P (MAP) Pulse Ox O2 Delivery O2 Flow Rate FiO2


 


6/10/17 08:00 99.9 98 18 125/77 97 Room Air  


 


6/10/17 06:46      Nasal Cannula 3.00 


 


6/10/17 00:00 99.1 85 18 139/83 100 Room Air  


 


17 21:00      Nasal Cannula 3.00 


 


17 19:20 100.5 78 20 160/91 93 Room Air  


 


17 17:13      Nasal Cannula 3.00 


 


17 17:11      Nasal Cannula 3.00 


 


17 16:47      Nasal Cannula 3.00 


 


17 15:35 97.5       


 


17 15:30 97.5       


 


17 11:36 98.1 92 16 164/87 95 Room Air  














I & O 


 


 6/10/17





 07:00


 


Intake Total 970 ml


 


Output Total 1490 ml


 


Balance -520 ml





Capillary Refill : Less Than 3 Seconds


General Appearance:  No Apparent Distress


Neck:  Normal Inspection


Respiratory:  No Accessory Muscle Use, No Respiratory Distress


Extremity:  No Calf Tenderness, Other (splint to RLE, drain in place)


Neurologic/Psychiatric:  Alert, Oriented x3, No Motor/Sensory Deficits, Normal 

Mood/Affect





Results


Lab


Laboratory Tests


6/10/17 05:57: 


White Blood Count 7.3, Red Blood Count 3.42L, Hemoglobin 8.8L, Hematocrit 29L, 

Mean Corpuscular Volume 84, Mean Corpuscular Hemoglobin 26, Mean Corpuscular 

Hemoglobin Concent 31L, Red Cell Distribution Width 14.1, Platelet Count 183, 

Mean Platelet Volume 10.0, Prothrombin Time 13.6, INR Comment 1.1, Sodium Level 

141, Potassium Level 3.6, Chloride Level 111H, Carbon Dioxide Level 19L, Anion 

Gap 11, Blood Urea Nitrogen 16, Creatinine 1.29, Estimat Glomerular Filtration 

Rate 42, BUN/Creatinine Ratio 12, Glucose Level 126H, Calcium Level 8.2L





Assessment/Plan


Assessment/Plan


Assess & Plan/Chief Complaint


Assessment:





POD #1 external fixator removal and ORIF right ankle





Plan:





Pain control


IS at bedside


meds reconcile


lovenox for DVT prophylaxis


non weight bearing to RLE for now





Clinical Quality Measures


DVT/VTE Risk/Contraindication:


Risk Factor Score Per Nursin


RFS Level Per Nursing on Admit:  4+=Very High











CINDY BROWN Alvarado 10, 2017 10:50

## 2017-06-10 NOTE — DISCHARGE SUMMARY
Diagnosis/Chief Complaint


Date of Admission


2017


Date of Discharge


6/10/2017


Admission Diagnosis


Admission Diagnosis


right ankle fracture





Discharge Diagnosis


same





Discharge Summary


Hospital Course


Hospital Course


patient admitted for ORIF right ankle and removal of external fixator.  She 

tolerated this procedure well and is being followed by Dr Lee's service.


Labs


Laboratory Tests


6/10/17 05:57: 


Red Blood Count 3.42L, Hemoglobin 8.8L, Hematocrit 29L, Mean Corpuscular 

Hemoglobin Concent 31L, Chloride Level 111H, Carbon Dioxide Level 19L, Glucose 

Level 126H, Calcium Level 8.2L





Procedures


None.





Discharge Physical Examination


Allergies:  


Coded Allergies:  


     codeine (Verified  Allergy, Mild, 17)


Vitals & I&Os





Vital Signs








  Date Time  Temp Pulse Resp B/P (MAP) Pulse Ox O2 Delivery O2 Flow Rate FiO2


 


6/10/17 08:00 99.9 98 18 125/77 97 Room Air  


 


6/10/17 06:46       3.00 








General Appearance:  Alert, Oriented X3, No Acute Distress


HEENT:  PERRLA


Respiratory:  Normal Air Movement


Cardiovascular:  Regular Rate


Abdominal:  Soft, No Tenderness


Extremities:  Other (rle in splint)


Neuro:  Normal Speech, Sensation Intact


Psych/Mental Status:  Mental Status NL





Discharge


Home Medications


Reviewed and agree with Discharge Medication list on patient's Discharge 

Instruction sheet


Instructions to Patient/Family


Please see electonic discharge instructions given to patient.





Clinical Quality Measures


DVT/VTE Risk/Contraindication:


Risk Factor Score Per Nursin


RFS Level Per Nursing on Admit:  4+=Very High











CINDY BROWN Alvarado 10, 2017 11:48

## 2017-06-10 NOTE — DISCHARGE INST-SIMPLE/STANDARD
Discharge Inst-Standard


Discharge Medications


New, Converted or Re-Newed RX:  Other





Patient Instructions/Follow Up


Plan of Care/Instructions/FU:  


keep leg elevated when non up


non weight bearing to RLE


keep splint clean and dry


Activity as Tolerated:  No


Discharge Diet:  No Restrictions


Return to The Hospital For:  


increasing pain


numbness or tingling in toes, cold foot, decreased pulse to foot











CINDY BROWN Alvarado 10, 2017 11:44

## 2017-06-10 NOTE — PHYSICAL THERAPY EVALUATION
PT Evaluation-General


Medical Diagnosis


Admission Date





Medical Diagnosis:  Fracture right ankle


Onset Date:  2017





Therapy Diagnosis


Therapy Diagnosis:  Limited mobility and weakness





Height/Weight


Height (Feet):  5


Height (Inches):  4.00


Weight (Pounds):  310


Weight (Ounces):  0.0





Precautions


Precautions/Isolations:  Standard Precautions





Weight Bear Status


Weight Bearing Restriction:  Full Weight Bearing, Non Weight Bearing


Location Restriction:  LT FOOT, RT FOOT





Referral


Physician:  Dr. Young


Reason for Referral:  Evaluation/Treatment





Medical History


Pertinent Medical History:  Arthritis, COPD, Fractures


Additional Medical History


TIA ()


Current History


Refracture of (R) ankle


Reviewed History:  Yes





Social History


Home:  Assisted Living





Prior/Core FIM


Prior Level of Function


              Functional [place] Measure


0=Not Assessed/NA   4=Minimal Assistance


1=Total Assistance   5=Supervision or Setup


2=Maximal Assistance   6=Modified [place]


3=Moderate Assistance   7=Complete [place]


Bed Mobility:  6


Transfers (B,C,W/C) (FIM):  6


Gait:  6





PT Evaluation-Current


Subjective


(R) ankle pain, limited mobility





Pain





   Numeric Pain Scale:  8


   Location:  Right


   Location Body Site:  Ankle


   Pain Description:  Stabbing, Sharp





Pt/Family Goals


Transfer to [facility]





Objective


Patient Orientation:  Person, Place, Time, Situation


Problem Solving:  Good





ROM/Strength


ROM Upper Extremities


WFL


ROM Lower Extremities


(B) hip and knee ROM intact.  (L) ankle ROM intact.  (R) ankle in temp cast


Strength Upper Extremities


WFL


Strenght Lower Extremities


4/5





Sensory


Hand Dominance:  Right





Transfers


              Functional [place] Measure


0=Not Assessed/NA   4=Minimal Assistance


1=Total Assistance   5=Supervision or Setup


2=Maximal Assistance   6=Modified [place]


3=Moderate Assistance   7=Complete [place]


Transfers (B, C, W/C) (FIM):  4


Scootin


Rollin


Supine to/from Sit:  4


Sit to/from Stand:  4





Gait


Mode of Locomotion:  Walk


Anticipated Mode of Locomotion:  Walk


Distance (FIM):  0=does not occure


Distance:  0 ft





Balance


Sitting Static:  Normal


Sitting Dynamic:  Good


Standing Static:  Good





Assessment/Needs


Pt is non-weight bearing on the (R).  Pt is aware of her status and is 

compliant during standing activity.


Rehab Potential:  Good





PT Short Term Goals


Short Term Goals


Time Frame:  2017


Transfers (B,C,W/C) (FIM):  5


Distance (FIM):  3=150 ft


Gait Level of Assist:  5


Gait Assistive Device:  FWW





PT Plan


Problem List


Problem List:  Activity Tolerance, Functional Strength, Gait





Treatment/Plan


Treatment Plan:  Continue Plan of Care


Treatment Plan:  Bed Mobility, Functional Activity Ta, Functional Strength, 

Gait, Therapeutic Exercise, Transfers


Treatment Duration:  2017


Visits Per Week:  11





Time/GCodes


Time In:  0935


Time Out:  1000


Total Billed Treatment Time:  25


Total Billed Treatment


1, marty HERNANDEZ Codes Necessary:  TATIANA Black PT [place] 10, 2017 12:16

## 2017-06-10 NOTE — OCCUPATIONAL THERAPY EVAL
OT Evaluation-General/PLF


Medical Diagnosis


Admission Date


[id number]


Medical Diagnosis:  Fracture right ankle


Onset Date:  Jun 9, 2017





Therapy Diagnosis


Therapy Diagnosis:  Fracture right ankle





Height/Weight


Height (Feet):  5


Height (Inches):  4.00


Weight (Pounds):  310


Weight (Ounces):  0.0





Precautions


Precautions/Isolations:  Standard Precautions


Safety Interventions:  None





Weight Bear Status


Weight Bearing Restriction:  Full Weight Bearing, Non Weight Bearing


Location Restriction:  LT FOOT, RT FOOT





Referral


Referral Reason:  Evaluation/Treatment





Medical History


Pertinent Medical History:  Arthritis, COPD, Fractures


Current History


Patient reports she was admitted yesterday to repair the re-fracture of the 

right ankle. She reports she fractured the right ankle initially in February of 

this year when she fell at home. She states she was cast then was wearing a 

boot on the right lower leg. She tells me she had a follow up visit with the 

doctor May 11 and it was discovered she had re fractured the ankle a second 

time. She underwent surgery yesterday, [id number].


Reviewed History:  Yes





Social History


Home:  Assisted Living


The patient reports that when discharged from the hospital the first time she 

went to the [facility]. She remained there until she was able to return home. She 

was living with her mother when she found out the ankle was fractured the 

second time. She then went to the [facility] in May to await this surgery. Her 

plan is to return to the [facility] when discharged.





ADL-Prior Level of Function


ADL PLOF Comments


She reports she is able to do a pivot transfer onto the bed side commode. She 

is able to do all upper body care. She has assistance at the [facility] with lower 

body care. She has a wheelchair from the facility that she states she uses 

daily.





OT Current Status


Subjective


"OK come on in."





Appearance


Seated in bed upon OT arrival. Agreeable to the evaluation.





Mental Status/Objective


Patient Orientation:  Person, Place, Situation


Attachments:  IV, Oxygen





Current


Glasses/Contacts:  Yes


Hearing Aids:  No


Dentures/Partials:  No


Hand Dominance:  Right


Upper Extremity ROM


WNL


Upper Extremity Coordination


WNL


Upper Extremity Sensation


WNL


Upper Extremity Strength


WNL


Edema:  None





ADL-Treatment


ADL-Current


Not assessed at this time.


              Functional [place] Measure


0=Not Assessed/NA   4=Minimal Assistance


1=Total Assistance   5=Supervision or Setup


2=Maximal Assistance   6=Modified [place]


3=Moderate Assistance   7=Complete IndependenceIRFPAI Quality Coding Scale











6 Independent with activity with or without an assistive device


 


5  Patient requires set up or clean up by helper.  Patient completes activity  

by  themselves


 


4 Supervision or touching assist (CGA). [place] provide cues , steadying assist


 


3 The helper provides less than half the effort to complete the activity


 


2 The helper provides more than half the effort to complete the activity


 


1 Dependent.  The helper does all the effort to complete an activity 


 


7 Patient refused to complete or attempt activity


 


9 The patient did not perform the activity before the current illness or injury


 


88 Not attempted due to Medical conditions or safety concerns











OT Short Term Goals


Short Term Goals


Time Frame:  Jun 14, 2017


Eating(FIM):  6


Grooming(FIM):  6


Bathing(FIM):  5


Upper Body Dressing(FIM):  6


Lower Body Dressing(FIM):  5


Toileting(FIM):  5


Transfers (B,C,W/C) (FIM):  5


Toilet/Commode Transfer(FIM):  5


Tub Transfer(FIM):  0


Shower Transfer(FIM):  0


1=Demonstrate adherence to instructed precautions during ADL tasks.


2=Patient will verbalize/demonstrate understanding of assistive devices/

modifications for ADL.


3=Patient will improve strength/tolerance for activity to enable patient to 

perform ADL's.





OT Long Term Goals


Long Term Goals


Time Frame:  Jun 19, 2017


Eating (FIM):  7


Grooming(FIM):  7


Bathing(FIM):  6


Upper Body Dressing(FIM):  6


Lower Body Dressing(FIM):  6


Toileting(FIM):  6


Transfers (B,C,W/C) (FIM):  6


Toilet/Commode Transfer(FIM):  6


Tub Transfer(FIM):  0


Shower Transfer(FIM):  0


1=Demonstrate adherence to instructed precautions during ADL tasks.


2=Patient will verbalize/demonstrate understanding of assistive devices/

modifications for ADL.


3=Patient will improve strength/tolerance for activity to enable patient to 

perform ADL's.





OT Education/Plan


Problem List/Assessment


Assessment:  Decreased Activ Tolerance, Impaired Self-Care Skills





Discharge Recommendations


Plan/Recommendations:  Continue POC


Barriers to Progress


None noted


Target Placement


Assisted living


Patient/Family Goals


[place]





Treatment Plan/Plan of Care


Treatment,Training & Education:  Yes


Patient would benefit from OT for education, treatment and training to promote 

independence in ADL's, mobility, safety and/or upper extremity function for ADL'

s.


Plan of Care:  ADL Retraining, Functional Mobility, UE Funct Exercise/Act


Treatment Duration:  Jun 17, 2017


# of days/week


Monday-Friday


Visits Per Week:  5


Minutes/Day (M-F):  20


Minutes/Day (Sat/[place]):  As needed.


Agreement:  Yes


Rehab Potential:  Good





Time/GCodes


Start Time:  10:00


Stop Time:  10:25


Total Time Billed (hr/min):  25


Billed Treatment Time


Erwin, RAYO Rai OT [place] 10, 2017 10:32

## 2017-06-13 ENCOUNTER — HOSPITAL ENCOUNTER (OUTPATIENT)
Dept: HOSPITAL 75 - RAD | Age: 62
End: 2017-06-13
Attending: FAMILY MEDICINE
Payer: MEDICAID

## 2017-06-13 DIAGNOSIS — N17.9: Primary | ICD-10-CM

## 2017-06-13 PROCEDURE — 93975 VASCULAR STUDY: CPT

## 2017-06-13 NOTE — DIAGNOSTIC IMAGING REPORT
EXAMINATION:

Renal duplex ultrasound.



INDICATION: 

Acute renal injury.



FINDINGS:

The right kidney is 9.8 and the left kidney is 11 cm in length.

There is no hydronephrosis or focal lesion.



The right renal artery velocities are 149 proximally, 132 at the

mid segment, and 98 cm/s distally.



The left renal artery proximal and mid segments are obscured. The

distal velocity is 95 cm/s.



The resistive index in the right kidney is in the range of 0.7 to

0.72 and on the right is 0.68 to 0.71.



IMPRESSION: 

The proximal and mid left renal artery segments are obscured by

bowel gas. No definite abnormality is seen otherwise.



Dictated by: 



  Dictated on workstation # [id number]

## 2017-06-14 NOTE — OPERATIVE REPORT
DATE OF SERVICE:  06/09/2017



SURGEON:

George Rodríguez DPM



ASSISTANT:

None.



PREOPERATIVE DIAGNOSES:

1.  Retained external fixator of the right ankle.

2.  Charcot deformity of the right ankle.



POSTOPERATIVE DIAGNOSES:

1.  Retained external fixator of the right ankle.

2.  Charcot deformity of the right ankle.



PROCEDURE PERFORMED:

1.  Removal of external fixator of the right ankle.

2.  Subtalar ankle joint fusion of right lower extremity with intramedullary

sharon.



ANESTHESIA:

General anesthesia.



HEMOSTASIS:

A pneumatic thigh tourniquet, right lower extremity at 300 mmHg.



BLOOD LOSS:

50 mL.



MATERIALS USED:

Dino screws and intramedullary nail, 3-0 Vicryl, 3-0 nylon.



INTRAOPERATIVE INJECTABLES:

None.



COMPLICATIONS:

None.



INDICATIONS FOR PROCEDURE:

The patient is a 61-year-old female who underwent open reduction and internal

fixation of her right ankle.  She then went on to have a Charcot event of her

right ankle and have further fractures of distal tibia.  The hardware was then

removed and she was placed in external fixator at this time.  Her soft tissues

are now allowing for definitive surgery with ankle and subtalar joint fusion. 

She has healed her lateral incision.  She has been made aware of the risks and

benefits of the surgery as well as any alternatives to undergoing the surgery

and she has signed consent prior to be taken back to the OR.



DESCRIPTION OF PROCEDURE:

Under mild sedation, the patient was brought into the OR and placed on the

operating table in supine position.  Following administration of general

anesthesia, a pneumatic thigh tourniquet was placed to the right lower

extremity.  The patient's external fixator was then removed on the hospital bed

and it was passed from the surgical field.  She was then transferred on to the

surgical table in a prone position with all bony prominences well-padded.  The

right lower extremity was again scrubbed, prepped and draped and a proper

timeout was performed to the right lower extremity.  Next, an approximately 8 cm

incision was made down the mid level of the Achilles tendon.  The incision was

then advanced through the Achilles tendon and the soft tissues were retracted. 

The incision was deepened through Kager's triangle and the ankle joint was

identified as well as subtalar joint.  The ankle joint was then distracted using

a Weinraub distractor and large distraction pins.  Once the ankle joint was

distracted, the ligaments attachments were freed with a Frank elevator and the

ankle was further distracted given access to the collagenous tissues.  The

cartilages were resected from the talar dome as well as the distal tibia using

osteotomes, rongeurs and curettes.  Once the collagenous tissues were resected

and passed from surgical field, subtalar bone was burred with 3 mm _____ and

appropriate irrigation.  Next, the subtalar joint was distracted and was

prepared for fusion in the same manner as the ankle joint.   There is good

healthy bleeding calcaneus bone noted to both the ankle and subtalar joint and

adequate tissue perfusion.  Next, a guide pin for the intramedullary nail was

placed at the distal aspect of the heel.  Under fluoroscopic guidance, the guide

pin was placed from the plantar heel into the distal aspect of the tibia.



The incision was made, approximately 4 cm incision was made to the plantar heel

and the dissection was deepened down to the level of the calcaneus with care

being taken to retract the neurovascular bundles to the medial aspect of the

plantar heel.  The calcaneus, talus and distal tibia were then drilled using

appropriate size drill.  The guide pin was removed and the ball tip was passed

up into the distal tibia.  The distal tibia was reamed, it was reamed to the

size of 11 mm.  There was some chatter noted to the tibia at the isthmus of the

canal, thus the indication for a size 10 intramedullary sharon was deemed

necessary.  The ball tips pin was then removed and the nail was then inserted

into the foot through stab incisions.  The talar screw was then placed first and

fluoroscopic guidance was used.  Medial tibial screw was placed.  Next, the

calcaneal screw was placed and rigid internal fixation was achieved through the

compression screw as well as the external compressor.  Next, the talar screw was

placed and the posterior and anterior calcaneal screw was then finally placed,

there was adequate fixation.  Good apposition of the fusion sites, any bony

voids were filled with demineralized bone matrix and the wounds were then

flushed with copious amounts of sterile saline.  Deep tissues were

reapproximated and closed with 3-0 Vicryl, subcutaneous tissues were

reapproximated with 3-0 Vicryl and the skin was reapproximated with the wound

edges well aligned using 3-0 nylon.  The drain was also placed into the

posterior wound just prior to closure.  The wound was then dressed with dry

sterile dressings consisting of 4 x 4s, Webril and Ace wrap in the posterior

splint.  The patient tolerated the procedure and anesthesia well.  She was

transferred from the OR to recovery with vital signs stable and neurovascular

status intact to the right lower extremity.





Job ID: [id number]

DocumentID: [id number]

Dictated Date:  06/13/2017 22:00:52

Transcription Date: 06/14/2017 01:50:50

Dictated By: GEORGE RODRÍGUEZ DPM

## 2017-06-14 NOTE — OPERATIVE REPORT
DATE OF SERVICE:  05/11/2017



SURGEON:

George Rodríguez DPM



ASSISTANT:

None.

 

PREOPERATIVE DIAGNOSES:

1.  Retained hardware right ankle.

2.  Charcot deformity fracture of the right ankle.



POSTOPERATIVE DIAGNOSES:

1.  Retained hardware right ankle.

2.  Charcot deformity fracture of the right ankle.



PROCEDURE PERFORMED:

1.  Removal of hardware right ankle.

2.  The application of external fixator, right ankle.



ANESTHESIA:

General anesthesia.



HEMOSTASIS:

A pneumatic thigh tourniquet, right lower extremity at 300 mmHg.



BLOOD LOSS:

100 mL.



MATERIAL USED:

3-0 Vicryl, 3-0 nylon.



INTRAOPERATIVE INJECTABLES:

None.



COMPLICATIONS:

None.



INDICATIONS FOR PROCEDURE:

The patient is a 61-year-old female who underwent open reduction and internal

fixation on the right, open ankle fracture approximately 10 weeks ago.  At this

time, she has developed a new fracture to her distal tibia, the posterior medial

aspect of the tibia these are consistent with Charcot changes of her right

ankle.  She also has retained hardware from a prior ankle surgery.  She

presented walking into my office this past week with swelling and pain.  Denies

any recent history of trauma.  The patient was made aware of the risks and

benefits of the surgery as well as the alternatives to undergoing it and signed

consent prior to be taken back to the OR.



DESCRIPTION OF PROCEDURE:

Under mild sedation, the patient was placed on the operating table in supine

position.  Upon administration of general anesthesia, a pneumatic thigh

tourniquet was placed on the right lower extremity.  The right lower extremity

was scrubbed, prepped and draped in the aseptic manner.  A proper timeout was

performed.  The right lower extremity was identified as the surgical site. 

Next, an approximately 8 cm incision was made in the lateral aspect of the

ankle.  The incision was deepened down to the level of the hardware with care

being taken to avoid all major neurovascular structures.  All bleeders were

cauterized and ligated as necessary.  The hardware to the lateral _____0205 was

resected and passed off surgical field as well as the hardware to the

anterolateral aspect of the tibia was also resected and passed off the surgical

field.  The wound was then flushed with copious amounts of sterile saline.  The

deep tissues were reapproximated and closed with 3-0 Vicryl.  Cultures were

taken just prior to closing the deep tissues.  The deep tissues were

reapproximated and closed with 3-0 Vicryl, subcutaneous tissues were

reapproximated with 3-0 Vicryl and the skin was reapproximated with the wound

edges well aligned using 3-0 nylon.  Next, the ankle joint was mainly reduced

using distraction across the ankle, once it was reduced; it was fixated with

external fixation.  Two stab incisions were made in the anterior crest of the

tibia and two incisions were then drilled and the appropriate size external

fixation pins were fixated to the midshaft of the tibia.  A block was used to

fixate the two for further stabilization.  Next, the stab incision was made, the

medial aspect of the heel as well as to the medial aspect of the medial

cuneiform through the stab incisions external fixation pins were also placed

bicortical and there was good fixation of the pins were noted.  A pin to the

cuneiform was placed from the medial cuneiform through the intermediate and into

the lateral aspect of the lateral cuneiform.  The external fixator was then set

up in connecting the pins with multiple rods and the ankle was then held in

reduction and the rods were then stabilized and further fixator with a clamps

from the pins to the rods.  There was adequate fixation noted.  The ankle was

noted to be reduced and the neurovascular status was intact.  The right lower

extremity then dressed with the dry sterile dressings consisting of Adaptic, 4 x

4s, Webril and Ace wrap. The patient tolerated the procedure and anesthesia

well.  She was transferred from the OR to recovery with vital signs stable and

neurovascular status intact to the right lower extremity.





Job ID: [id number]

DocumentID: [id number]

Dictated Date:  06/13/2017 21:54:22

Transcription Date: 06/14/2017 00:19:30

Dictated By: GEORGE RODRÍGUEZ DPM

## 2018-01-25 ENCOUNTER — HOSPITAL ENCOUNTER (OUTPATIENT)
Dept: HOSPITAL 75 - CARD | Age: 63
End: 2018-01-25
Attending: FAMILY MEDICINE
Payer: MEDICAID

## 2018-01-25 DIAGNOSIS — R60.9: Primary | ICD-10-CM

## 2018-01-25 PROCEDURE — 93306 TTE W/DOPPLER COMPLETE: CPT

## 2018-02-26 ENCOUNTER — HOSPITAL ENCOUNTER (OUTPATIENT)
Dept: HOSPITAL 75 - RAD | Age: 63
End: 2018-02-26
Attending: FAMILY MEDICINE
Payer: MEDICAID

## 2018-02-26 DIAGNOSIS — Z12.31: Primary | ICD-10-CM

## 2018-02-26 PROCEDURE — 77067 SCR MAMMO BI INCL CAD: CPT

## 2018-02-26 NOTE — DIAGNOSTIC IMAGING REPORT
Digital mammogram bilateral screening.



This study was compared to the prior exams of 07/31/2015 and

07/14/2015. At this time, there are no current complaints. 



The current study was also evaluated with a Computer Aided

Detection (CAD) system.



FINDINGS: There are scattered fibroglandular densities in both

breasts which could obscure a lesion. Overall, there does not

appear to have been any significant change when compared to the

prior exam. No primary or secondary sign of malignancy is noted.



IMPRESSION:

1. There is no evidence for malignancy.

2. The patient should have her annual bilateral screening

mammogram on schedule in February 2019.



ACR BI-RADS Category 1: Negative.

Result letter will be mailed to the patient.

Note:  At least 10% of breast cancer is not imaged by

mammography.





  Dictated on workstation # [id number]

## 2021-04-05 ENCOUNTER — HOSPITAL ENCOUNTER (INPATIENT)
Dept: HOSPITAL 75 - ER | Age: 66
LOS: 3 days | Discharge: HOME HEALTH | DRG: 189 | End: 2021-04-08
Attending: INTERNAL MEDICINE | Admitting: INTERNAL MEDICINE
Payer: MEDICAID

## 2021-04-05 VITALS — SYSTOLIC BLOOD PRESSURE: 133 MMHG | DIASTOLIC BLOOD PRESSURE: 69 MMHG

## 2021-04-05 VITALS — SYSTOLIC BLOOD PRESSURE: 100 MMHG | DIASTOLIC BLOOD PRESSURE: 56 MMHG

## 2021-04-05 VITALS — WEIGHT: 293 LBS | HEIGHT: 62.01 IN | BODY MASS INDEX: 53.24 KG/M2

## 2021-04-05 DIAGNOSIS — M19.91: ICD-10-CM

## 2021-04-05 DIAGNOSIS — J44.1: ICD-10-CM

## 2021-04-05 DIAGNOSIS — F17.210: ICD-10-CM

## 2021-04-05 DIAGNOSIS — Z79.891: ICD-10-CM

## 2021-04-05 DIAGNOSIS — E03.9: ICD-10-CM

## 2021-04-05 DIAGNOSIS — Z88.6: ICD-10-CM

## 2021-04-05 DIAGNOSIS — Z20.822: ICD-10-CM

## 2021-04-05 DIAGNOSIS — H54.3: ICD-10-CM

## 2021-04-05 DIAGNOSIS — F32.9: ICD-10-CM

## 2021-04-05 DIAGNOSIS — J96.01: Primary | ICD-10-CM

## 2021-04-05 LAB
ALBUMIN SERPL-MCNC: 4 GM/DL (ref 3.2–4.5)
ALP SERPL-CCNC: 107 U/L (ref 40–136)
ALT SERPL-CCNC: 20 U/L (ref 0–55)
ARTERIAL PATENCY WRIST A: (no result)
BASE EXCESS STD BLDA CALC-SCNC: -5.1 MMOL/L (ref -2.5–2.5)
BASOPHILS # BLD AUTO: 0.1 10^3/UL (ref 0–0.1)
BASOPHILS NFR BLD AUTO: 1 % (ref 0–10)
BDY SITE: (no result)
BILIRUB SERPL-MCNC: 0.6 MG/DL (ref 0.1–1)
BODY TEMPERATURE: 96.4
BUN/CREAT SERPL: 17
CALCIUM SERPL-MCNC: 8.5 MG/DL (ref 8.5–10.1)
CHLORIDE SERPL-SCNC: 106 MMOL/L (ref 98–107)
CO2 BLDA CALC-SCNC: 21.8 MMOL/L (ref 21–31)
CO2 SERPL-SCNC: 19 MMOL/L (ref 21–32)
CREAT SERPL-MCNC: 1.05 MG/DL (ref 0.6–1.3)
EOSINOPHIL # BLD AUTO: 0.2 10^3/UL (ref 0–0.3)
EOSINOPHIL NFR BLD AUTO: 2 % (ref 0–10)
GFR SERPLBLD BASED ON 1.73 SQ M-ARVRAT: 53 ML/MIN
GLUCOSE SERPL-MCNC: 139 MG/DL (ref 70–105)
HCT VFR BLD CALC: 45 % (ref 35–52)
HGB BLD-MCNC: 14.2 G/DL (ref 11.5–16)
INHALED O2 FLOW RATE: (no result) L/MIN
LYMPHOCYTES # BLD AUTO: 2.3 10^3/UL (ref 1–4)
LYMPHOCYTES NFR BLD AUTO: 29 % (ref 12–44)
MANUAL DIFFERENTIAL PERFORMED BLD QL: NO
MCH RBC QN AUTO: 27 PG (ref 25–34)
MCHC RBC AUTO-ENTMCNC: 32 G/DL (ref 32–36)
MCV RBC AUTO: 86 FL (ref 80–99)
MONOCYTES # BLD AUTO: 0.4 10^3/UL (ref 0–1)
MONOCYTES NFR BLD AUTO: 5 % (ref 0–12)
NEUTROPHILS # BLD AUTO: 5.1 10^3/UL (ref 1.8–7.8)
NEUTROPHILS NFR BLD AUTO: 64 % (ref 42–75)
PCO2 BLDA: 42 MMHG (ref 35–45)
PH BLDA: 7.3 [PH] (ref 7.37–7.43)
PLATELET # BLD: 233 10^3/UL (ref 130–400)
PMV BLD AUTO: 9.6 FL (ref 9–12.2)
PO2 BLDA: 80 MMHG (ref 79–93)
POTASSIUM SERPL-SCNC: 3.7 MMOL/L (ref 3.6–5)
PROT SERPL-MCNC: 7.3 GM/DL (ref 6.4–8.2)
SAO2 % BLDA FROM PO2: 97 % (ref 94–100)
SODIUM SERPL-SCNC: 139 MMOL/L (ref 135–145)
VENTILATION MODE VENT: NO
WBC # BLD AUTO: 8 10^3/UL (ref 4.3–11)

## 2021-04-05 PROCEDURE — 71045 X-RAY EXAM CHEST 1 VIEW: CPT

## 2021-04-05 PROCEDURE — 96361 HYDRATE IV INFUSION ADD-ON: CPT

## 2021-04-05 PROCEDURE — 85027 COMPLETE CBC AUTOMATED: CPT

## 2021-04-05 PROCEDURE — 82805 BLOOD GASES W/O2 SATURATION: CPT

## 2021-04-05 PROCEDURE — 80048 BASIC METABOLIC PNL TOTAL CA: CPT

## 2021-04-05 PROCEDURE — 94761 N-INVAS EAR/PLS OXIMETRY MLT: CPT

## 2021-04-05 PROCEDURE — 94640 AIRWAY INHALATION TREATMENT: CPT

## 2021-04-05 PROCEDURE — 94664 DEMO&/EVAL PT USE INHALER: CPT

## 2021-04-05 PROCEDURE — 80053 COMPREHEN METABOLIC PANEL: CPT

## 2021-04-05 PROCEDURE — 36415 COLL VENOUS BLD VENIPUNCTURE: CPT

## 2021-04-05 PROCEDURE — 96374 THER/PROPH/DIAG INJ IV PUSH: CPT

## 2021-04-05 PROCEDURE — 85025 COMPLETE CBC W/AUTO DIFF WBC: CPT

## 2021-04-05 PROCEDURE — 87804 INFLUENZA ASSAY W/OPTIC: CPT

## 2021-04-05 PROCEDURE — 87635 SARS-COV-2 COVID-19 AMP PRB: CPT

## 2021-04-05 PROCEDURE — 86141 C-REACTIVE PROTEIN HS: CPT

## 2021-04-05 PROCEDURE — 94760 N-INVAS EAR/PLS OXIMETRY 1: CPT

## 2021-04-05 RX ADMIN — SODIUM CHLORIDE, SODIUM LACTATE, POTASSIUM CHLORIDE, AND CALCIUM CHLORIDE SCH MLS/HR: 600; 310; 30; 20 INJECTION, SOLUTION INTRAVENOUS at 22:20

## 2021-04-05 RX ADMIN — ENOXAPARIN SODIUM SCH MG: 100 INJECTION SUBCUTANEOUS at 22:02

## 2021-04-05 NOTE — DIAGNOSTIC IMAGING REPORT
CHEST 1 VIEW, AP/PA ONLY



Indication: Shortness of air



Comparison: 05/04/2017



Findings:

No focal airspace disease in the visualized lungs. Please note

that the posterior lower lobes are poorly evaluated by portable

radiography. No pleural effusion or pneumothorax. Normal

cardiomediastinal silhouette.



Impression: 

1. No acute cardiopulmonary process by portable radiography.



Dictated by: 



  Dictated on workstation # [id number]

## 2021-04-05 NOTE — ED RESPIRATORY
General


Chief Complaint:  Respiratory Problems


Stated Complaint:  COPD


Nursing Triage Note:  


PRESENTS [facility]IA [facility] C[facility] FROM HOME WITH C/O SUDDEN ONSET SOA AT 1830 ON THIS 


DAY. PTA EMS ACCESSED SL TO L AC ET ADM DUONEB BRTX. PRESENTS UTILIZING 3L O2 


WITH INITIAL SPO2 94% DRY COUGH NOTED. REPORTS HX COPD.


Source:  patient, EMS


Exam Limitations:  no limitations





History of Present Illness


Date Seen by Provider:  2021


Time Seen by Provider:  19:32


Initial Comments


The patient presents to the ER by EMS from home with chief complaint for the 

past 2 to 3 days has had progressively worsening wheezing and shortness of air. 

She has a history of COPD dependent on DuoNeb however she does not rely on 

supplemental oxygen.  Tonight while sitting on her chair she says it hit her 

even worse and she could not catch her breath because she was coughing so much. 

Her cough is dry nonproductive.  She smokes about half pack cigarettes per day. 

She has had no fevers or sick contacts.  She is not had Covid or influenza.  She

has not had a Covid vaccine.  She did have a negative Covid test 2 months ago 

when she went in for a surgery.  She is not having any pain anywhere and after 

DuoNeb by nebulizer given by EMS on route she is feeling much better.  She was 

93% on room air when they arrived.  She has not been on steroids for many 

months.





Allergies and Home Medications


Allergies


Coded Allergies:  


     codeine (Verified  Allergy, Mild, 17)





Home Medications


Albuterol Sulfate 2.5 Mg/3 Ml Vial.neb, 3 ML IH EVERY 4-6 HOURS PRN for 

SHORTNESS OF BREATH, (Reported)


Budesonide/Formoterol Fumarate 10.2 Gm Hfa.aer.ad, 2 PUFF IH BID PRN for 

SHORTNESS OF BREATH, (Reported)


Desmopressin Acetate 0.2 Mg Tablet, 0.6 MG PO HS, (Reported)


   take 3 (0.2 mg) tabs 


Doxepin HCl 50 Mg Capsule, 50 MG PO HS, (Reported)


Duloxetine HCl 30 Mg Capsule.dr, 30 MG PO DAILY, (Reported)


   TAKES ALONG WITH DULOXETINE 60MG DAILY 


Duloxetine HCl 60 Mg Capsule.dr, 60 MG PO DAILY, (Reported)


   TAKES ALONG WITH DULOXETINE 30MG DAILY  


Enoxaparin Sodium 40 Mg/0.4 Ml Syringe, 40 MG SQ DAILY, (Reported)


Imipramine HCl 25 Mg Tablet, 25 MG PO HS, (Reported)


Lactulose 10 Gm/15 Ml Solution, 10 GM PO BID PRN for CONSTIPATION-1ST LINE, 

(Reported)


Levothyroxine Sodium 100 Mcg Tablet, 100 MCG PO DAILY, (Reported)


Loperamide HCl 2 Mg Capsule, 2 MG PO UD, (Reported)


   take 1 tab after every loose stool, max of 4 doses in 24 hours 


Mag Hydrox/Al Hydrox/Simeth 30 Ml Oral.susp, 30 ML PO Q4H PRN for INDIGESTION, 

(Reported)


Oxybutynin Chloride 10 Mg Tab.er.24, 10 MG PO HS, (Reported)


Oxycodone HCl/Acetaminophen 1 Each Tablet, 1-2 EACH PO Q6H PRN for PAIN-SEVERE, 

(Reported)


Simvastatin 10 Mg Tablet, 10 MG PO HS, (Reported)


Tiotropium Bromide 1 Inh Aerp, 2 PUFF IH DAILY, (Reported)


Tizanidine HCl 4 Mg Tablet, 4 MG PO Q8H, (Reported)


Topiramate 100 Mg Tablet, 100 MG PO BID, (Reported)


Tramadol HCl 50 Mg Tablet, 50 MG PO Q8H PRN for PAIN, (Reported)





Patient Home Medication List


Home Medication List Reviewed:  Yes





Review of Systems


Review of Systems


Constitutional:  No chills, No fever, No malaise, No weakness


EENTM:  No ear discharge, No ear pain


Respiratory:  cough; No phlegm; short of breath, wheezing


Cardiovascular:  No chest pain, No edema


Gastrointestinal:  No abdominal pain, No loss of appetite, No melena, No nausea


Genitourinary:  No discharge, No dysuria


Musculoskeletal:  No back pain, No joint pain





All Other Systems Reviewed


Negative Unless Noted:  Yes





Past Medical-Social-Family Hx


Patient Social History


Alcohol Use:  Denies Use


Drug of Choice:  Denies


Smoking Status:  Current Everyday Smoker


Type Used:  Cigarettes (Half pack per day)


Recent Infectious Disease Expo:  No


Recent Hopitalizations:  No





Immunizations Up To Date


Tetanus Booster (TDap):  Unknown


Date of Pneumonia Vaccine:  2011





Seasonal Allergies


Seasonal Allergies:  No





Past Medical History


Surgeries:  Yes ( x3, bilat TKR, Left ankle x2, R ankle)


Respiratory:  Yes (COPD)


Asthma, Chronic Bronchitis, COPD


Currently Using CPAP:  No


Currently Using BIPAP:  No


Cardiac:  No


Neurological:  Yes


Reproductive Disorders:  No


Genitourinary:  No


Gastrointestinal:  No


Musculoskeletal:  Yes (ARTHRITIS, RIGHT ANKLE FX)


Arthritis


Endocrine:  Yes


Hypothyroidsim


HEENT:  No (glasses, no teeth)


Loss of Vision:  Bilateral


Hearing Impairment:  Denies


Cancer:  No


Psychosocial:  Yes


Depression


Integumentary:  No


Blood Disorders:  No





Family Medical History





Patient reports no known family medical history.





Mom: alive





Physical Exam





Vital Signs - First Documented








 21





 19:35


 


Temp 35.8


 


Pulse 105


 


Resp 22


 


B/P (MAP) 150/71 (97)


 


Pulse Ox 94


 


O2 Delivery Nasal Cannula


 


O2 Flow Rate 3.00





Capillary Refill : Less Than 3 Seconds


Height: 5'4.00"


Weight: 310lbs. 0.0oz. 140.903831tk; 54.00 BMI


Method:Stated


General Appearance:  WD/WN, mild distress


Eyes:  Bilateral Eye Normal Inspection, Bilateral Eye PERRL, Bilateral Eye EOMI


HEENT:  PERRL/EOMI, normal ENT inspection, pharynx normal


Neck:  full range of motion, supple, normal inspection


Respiratory:  respiratory distress (Mild to moderate.  Oxygen saturation 93% on 

room air with increased work of breathing), decreased breath sounds, accessory 

muscle use (Mild), wheezing (Mild bilateral)


Cardiovascular:  normal peripheral pulses, regular rate, rhythm


Gastrointestinal:  non tender, soft


Neurologic/Psychiatric:  alert, normal mood/affect, oriented x 3


Skin:  normal color, warm/dry





Progress/Results/Core Measures


Suspected Sepsis


Recent Fever Within 48 Hours:  No


Infection Criteria Present:  Suspected New Infection


New/Unexplained  Altered Menta:  No


Sepsis Screen:  Possible Sepsis Risk


SIRS


Temperature: 


Pulse: 105 


Respiratory Rate: 22


 


Laboratory Tests


21 19:40: White Blood Count 8.0


Blood Pressure 150 /71 


Mean: 97


 


Laboratory Tests


21 19:40: 


Creatinine 1.05, Platelet Count 233, Total Bilirubin 0.6








Results/Orders


Lab Results





Laboratory Tests








Test


 21


19:40 Range/Units


 


 


White Blood Count


 8.0 


 4.3-11.0


10^3/uL


 


Red Blood Count


 5.21 H


 3.80-5.11


10^6/uL


 


Hemoglobin 14.2  11.5-16.0  g/dL


 


Hematocrit 45  35-52  %


 


Mean Corpuscular Volume 86  80-99  fL


 


Mean Corpuscular Hemoglobin 27  25-34  pg


 


Mean Corpuscular Hemoglobin


Concent 32 


 32-36  g/dL





 


Red Cell Distribution Width 13.7  10.0-14.5  %


 


Platelet Count


 233 


 130-400


10^3/uL


 


Mean Platelet Volume 9.6  9.0-12.2  fL


 


Immature Granulocyte % (Auto) 0   %


 


Neutrophils (%) (Auto) 64  42-75  %


 


Lymphocytes (%) (Auto) 29  12-44  %


 


Monocytes (%) (Auto) 5  0-12  %


 


Eosinophils (%) (Auto) 2  0-10  %


 


Basophils (%) (Auto) 1  0-10  %


 


Neutrophils # (Auto)


 5.1 


 1.8-7.8


10^3/uL


 


Lymphocytes # (Auto)


 2.3 


 1.0-4.0


10^3/uL


 


Monocytes # (Auto)


 0.4 


 0.0-1.0


10^3/uL


 


Eosinophils # (Auto)


 0.2 


 0.0-0.3


10^3/uL


 


Basophils # (Auto)


 0.1 


 0.0-0.1


10^3/uL


 


Immature Granulocyte # (Auto)


 0.0 


 0.0-0.1


10^3/uL


 


Blood Gas Puncture Site RIGHT RADIAL   


 


Blood Gas Patient Temperature 96.4   


 


Arterial Blood pH 7.30 *L 7.37-7.43  


 


Arterial Blood Partial


Pressure CO2 42 


 35-45  MMHG





 


Arterial Blood Partial


Pressure O2 80 


 79-93  MMHG





 


Arterial Blood HCO3 21 L 23-27  MMOL/L


 


Arterial Blood Total CO2


 21.8 


 21.0-31.0


MMOL/L


 


Arterial Blood Oxygen


Saturation 97 


   %





 


Arterial Blood Base Excess


 -5.1 L


 -2.5-2.5


MMOL/L


 


Josiah Test YES-POS   


 


Blood Gas Ventilator Setting NO   


 


Blood Gas Inspired Oxygen 2 L   


 


Sodium Level 139  135-145  MMOL/L


 


Potassium Level 3.7  3.6-5.0  MMOL/L


 


Chloride Level 106    MMOL/L


 


Carbon Dioxide Level 19 L 21-32  MMOL/L


 


Anion Gap 14  5-14  MMOL/L


 


Blood Urea Nitrogen 18  7-18  MG/DL


 


Creatinine


 1.05 


 0.60-1.30


MG/DL


 


Estimat Glomerular Filtration


Rate 53 


  





 


BUN/Creatinine Ratio 17   


 


Glucose Level 139 H   MG/DL


 


Calcium Level 8.5  8.5-10.1  MG/DL


 


Corrected Calcium 8.5  8.5-10.1  MG/DL


 


Total Bilirubin 0.6  0.1-1.0  MG/DL


 


Aspartate Amino Transf


(AST/SGOT) 20 


 5-34  U/L





 


Alanine Aminotransferase


(ALT/SGPT) 20 


 0-55  U/L





 


Alkaline Phosphatase 107    U/L


 


C-Reactive Protein High


Sensitivity 0.24 


 0.00-0.50


MG/DL


 


Total Protein 7.3  6.4-8.2  GM/DL


 


Albumin 4.0  3.2-4.5  GM/DL


 


Coronavirus 2019 (JOSIAS) Negative  Negative  








Micro Results





Microbiology


21 Influenza Types A,B Antigen (JOSE) - Final, Complete


         





My Orders





Orders - KIMBERLEE ANDRADE


Arterial Blood Gas (21 19:43)


Methylprednisolone Sod Succ (Solu-Medrol (21 20:00)


Chest 1 View, Ap/Pa Only (21 19:46)


Cbc With Automated Diff (21 19:46)


Comprehensive Metabolic Panel (21 19:46)


Hs C Reactive Protein (21 19:46)


Covid 19 Inhouse Test (21 19:46)


Influenza A And B Antigens (21 19:46)


Ed Iv/Invasive Line Start (21 19:57)


Ns Iv 1000 Ml (Sodium Chloride 0.9%) (21 20:00)





Medications Given in ED





Current Medications








 Medications  Dose


 Ordered  Sig/Joseline


 Route  Start Time


 Stop Time Status Last Admin


Dose Admin


 


 Methylprednisolone


 Sodium Succinate  125 mg  ONCE  ONCE


 IVP  21 20:00


 21 20:01 DC 21 19:55


125 MG








Vital Signs/I&O











 21





 19:35


 


Temp 35.8


 


Pulse 105


 


Resp 22


 


B/P (MAP) 150/71 (97)


 


Pulse Ox 94


 


O2 Delivery Nasal Cannula


 


O2 Flow Rate 3.00





Capillary Refill : Less Than 3 Seconds








Blood Pressure Mean:                    97








Progress Note :  


   Time:  19:55


Progress Note


Heart rate 105 so we will give her a liter of fluids.  Solu-Medrol 125 mg IV.  

We will do a Covid and influenza rule out however she does not seem to be 

dealing with infection as much as COPD exacerbation.





Diagnostic Imaging





   Diagonstic Imaging:  Xray


   Plain Films/CT/US/NM/MRI:  chest


Comments


                 [facility] [place], [place]





NAME:   BELKIS POWER


MED REC#:   [id number]


ACCOUNT#:   [id number]


PT STATUS:   REG ER


:   1955


PHYSICIAN:   KIMBERLEE ANDRADE MD


ADMIT DATE:   21/ER


                                  ***Signed***


Date of Exam:21





CHEST 1 VIEW, AP/PA ONLY








CHEST 1 VIEW, AP/PA ONLY





Indication: Shortness of air





Comparison: 2017





Findings:


No focal airspace disease in the visualized lungs. Please note


that the posterior lower lobes are poorly evaluated by portable


radiography. No pleural effusion or pneumothorax. Normal


cardiomediastinal silhouette.





Impression: 


1. No acute cardiopulmonary process by portable radiography.





Dictated by: 





  Dictated on workstation # [id number]








Dict:   21


Trans:   21


[facility] [id number]





Interpreted by:     OLAYINKA PAGAN MD


Electronically signed by: OLAYINKA PAGAN MD 21


   Reviewed:  Reviewed by Me





Departure


Impression





   Primary Impression:  


   COPD exacerbation


   Additional Impression:  


   Acute respiratory failure with hypoxemia


Disposition:   ADMITTED AS INPATIENT


Condition:  Stable





Admissions


Decision to Admit Reason:  Admit from ER (General)


Decision to Admit/Date:  2021


Time/Decision to Admit Time:  19:40





Departure-Patient Inst.


Referrals:  


JAYLENE PIPER MD (PCP/Family)


Primary Care Physician











KIMBERLEE ANDRADE                  2021 19:56

## 2021-04-06 VITALS — SYSTOLIC BLOOD PRESSURE: 123 MMHG | DIASTOLIC BLOOD PRESSURE: 59 MMHG

## 2021-04-06 VITALS — DIASTOLIC BLOOD PRESSURE: 59 MMHG | SYSTOLIC BLOOD PRESSURE: 123 MMHG

## 2021-04-06 VITALS — DIASTOLIC BLOOD PRESSURE: 86 MMHG | SYSTOLIC BLOOD PRESSURE: 127 MMHG

## 2021-04-06 VITALS — DIASTOLIC BLOOD PRESSURE: 59 MMHG | SYSTOLIC BLOOD PRESSURE: 102 MMHG

## 2021-04-06 VITALS — SYSTOLIC BLOOD PRESSURE: 108 MMHG | DIASTOLIC BLOOD PRESSURE: 57 MMHG

## 2021-04-06 VITALS — DIASTOLIC BLOOD PRESSURE: 60 MMHG | SYSTOLIC BLOOD PRESSURE: 136 MMHG

## 2021-04-06 VITALS — SYSTOLIC BLOOD PRESSURE: 141 MMHG | DIASTOLIC BLOOD PRESSURE: 63 MMHG

## 2021-04-06 LAB
BASOPHILS # BLD AUTO: 0 10^3/UL (ref 0–0.1)
BASOPHILS NFR BLD AUTO: 0 % (ref 0–10)
BUN/CREAT SERPL: 22
CALCIUM SERPL-MCNC: 8 MG/DL (ref 8.5–10.1)
CHLORIDE SERPL-SCNC: 112 MMOL/L (ref 98–107)
CO2 SERPL-SCNC: 17 MMOL/L (ref 21–32)
CREAT SERPL-MCNC: 0.89 MG/DL (ref 0.6–1.3)
EOSINOPHIL # BLD AUTO: 0 10^3/UL (ref 0–0.3)
EOSINOPHIL NFR BLD AUTO: 0 % (ref 0–10)
GFR SERPLBLD BASED ON 1.73 SQ M-ARVRAT: > 60 ML/MIN
GLUCOSE SERPL-MCNC: 171 MG/DL (ref 70–105)
HCT VFR BLD CALC: 41 % (ref 35–52)
HGB BLD-MCNC: 12.7 G/DL (ref 11.5–16)
LYMPHOCYTES # BLD AUTO: 0.8 10^3/UL (ref 1–4)
LYMPHOCYTES NFR BLD AUTO: 10 % (ref 12–44)
MANUAL DIFFERENTIAL PERFORMED BLD QL: NO
MCH RBC QN AUTO: 27 PG (ref 25–34)
MCHC RBC AUTO-ENTMCNC: 31 G/DL (ref 32–36)
MCV RBC AUTO: 88 FL (ref 80–99)
MONOCYTES # BLD AUTO: 0.1 10^3/UL (ref 0–1)
MONOCYTES NFR BLD AUTO: 1 % (ref 0–12)
NEUTROPHILS # BLD AUTO: 7.2 10^3/UL (ref 1.8–7.8)
NEUTROPHILS NFR BLD AUTO: 89 % (ref 42–75)
PLATELET # BLD: 201 10^3/UL (ref 130–400)
PMV BLD AUTO: 10 FL (ref 9–12.2)
POTASSIUM SERPL-SCNC: 4.2 MMOL/L (ref 3.6–5)
SODIUM SERPL-SCNC: 139 MMOL/L (ref 135–145)
WBC # BLD AUTO: 8.1 10^3/UL (ref 4.3–11)

## 2021-04-06 RX ADMIN — SODIUM CHLORIDE, SODIUM LACTATE, POTASSIUM CHLORIDE, AND CALCIUM CHLORIDE SCH MLS/HR: 600; 310; 30; 20 INJECTION, SOLUTION INTRAVENOUS at 16:27

## 2021-04-06 RX ADMIN — METHYLPREDNISOLONE SODIUM SUCCINATE SCH MG: 125 INJECTION, POWDER, FOR SOLUTION INTRAMUSCULAR; INTRAVENOUS at 04:24

## 2021-04-06 RX ADMIN — NICOTINE SCH MG: 14 PATCH, EXTENDED RELEASE TRANSDERMAL at 16:24

## 2021-04-06 RX ADMIN — IPRATROPIUM BROMIDE AND ALBUTEROL SULFATE SCH ML: .5; 3 SOLUTION RESPIRATORY (INHALATION) at 20:57

## 2021-04-06 RX ADMIN — ZOLPIDEM TARTRATE SCH MG: 5 TABLET ORAL at 21:02

## 2021-04-06 RX ADMIN — IPRATROPIUM BROMIDE AND ALBUTEROL SULFATE SCH ML: .5; 3 SOLUTION RESPIRATORY (INHALATION) at 08:40

## 2021-04-06 RX ADMIN — METHYLPREDNISOLONE SODIUM SUCCINATE SCH MG: 125 INJECTION, POWDER, FOR SOLUTION INTRAMUSCULAR; INTRAVENOUS at 21:03

## 2021-04-06 RX ADMIN — TOPIRAMATE SCH MG: 100 TABLET, FILM COATED ORAL at 21:02

## 2021-04-06 RX ADMIN — METHYLPREDNISOLONE SODIUM SUCCINATE SCH MG: 125 INJECTION, POWDER, FOR SOLUTION INTRAMUSCULAR; INTRAVENOUS at 10:21

## 2021-04-06 RX ADMIN — METHYLPREDNISOLONE SODIUM SUCCINATE SCH MG: 125 INJECTION, POWDER, FOR SOLUTION INTRAMUSCULAR; INTRAVENOUS at 16:24

## 2021-04-06 RX ADMIN — OXYBUTYNIN CHLORIDE SCH MG: 5 TABLET ORAL at 21:05

## 2021-04-06 RX ADMIN — ENOXAPARIN SODIUM SCH MG: 100 INJECTION SUBCUTANEOUS at 21:03

## 2021-04-06 RX ADMIN — SODIUM CHLORIDE, SODIUM LACTATE, POTASSIUM CHLORIDE, AND CALCIUM CHLORIDE SCH MLS/HR: 600; 310; 30; 20 INJECTION, SOLUTION INTRAVENOUS at 09:51

## 2021-04-06 RX ADMIN — LEVOTHYROXINE SODIUM SCH MCG: 100 TABLET ORAL at 05:59

## 2021-04-06 RX ADMIN — ENOXAPARIN SODIUM SCH MG: 100 INJECTION SUBCUTANEOUS at 10:21

## 2021-04-06 RX ADMIN — IPRATROPIUM BROMIDE AND ALBUTEROL SULFATE SCH ML: .5; 3 SOLUTION RESPIRATORY (INHALATION) at 15:05

## 2021-04-06 RX ADMIN — NAPROXEN SCH MG: 250 TABLET ORAL at 21:03

## 2021-04-06 RX ADMIN — ACETAMINOPHEN PRN MG: 500 TABLET ORAL at 21:10

## 2021-04-06 RX ADMIN — SIMVASTATIN SCH MG: 10 TABLET, FILM COATED ORAL at 21:03

## 2021-04-06 RX ADMIN — CARVEDILOL SCH MG: 25 TABLET, FILM COATED ORAL at 21:04

## 2021-04-06 RX ADMIN — ACETAMINOPHEN PRN MG: 500 TABLET ORAL at 00:10

## 2021-04-06 NOTE — HISTORY & PHYSICAL-HOSPITALIST
EMILY MOHAN MED STUDENT 4/6/21 1223:


History of Present Illness


HPI/Chief Complaint


Ms. Rosario is a 65 year old female seen today due to a COPD exacerbation.  She 

reports slight worsening of her SOB and cough over the past several days, with a

sudden worsening of symptoms yesterday evening while cooking dinner.  She used 

her albuterol inhalers but reports no benefit and even felt worse afterward due 

to breathing hard.  Only other symptom was diaphoresis with shortness of breath.

 She denies having any fevers/chills, dizziness, syncope, CP, palpitations, 

edema, swelling, numbness/tingling, or weakness.


Source:  patient


Exam Limitations:  no limitations


Date Seen


4/6/21


Time Seen by a Provider:  09:15


Attending Physician


Phan Shaffer MD


PCP


Bria Moore MD


Referring Physician





Date of Admission


Apr 5, 2021 at 20:40





Home Medications & Allergies


Home Medications


Reviewed patient Home Medication Reconciliation performed by pharmacy medication

reconciliations technician and/or nursing.


Patients Allergies have been reviewed.





Allergies





Allergies


Coded Allergies


  codeine (Verified Allergy, Mild, rash, 4/6/21)








Patient Social History


Tobacco Use?:  Yes


Tobacco type used:  Cigarettes


Smoking Status:  Current Everyday Smoker (1/2 ppd)


Smokeless Tobacco Frequency:  Never a User


Use of E-Cig and/or Vaping dev:  No


Substance use?:  No


Alcohol Use?:  No


Pt stated abuse/neglect:  No





Immunizations Up To Date


Influenza Vaccine Up-to-Date:  No; Not Current


Tetanus Booster (TDap):  Unknown


Date of Pneumonia Vaccine:  Apr 1, 2011





Current Status


Pregnancy status:  No


Do you have an Advance Directi:  No


Communicates:  Verbally


Primary Language:  English


Preferred Spoken Language:  English


Is interpretation needed?:  No


Sensory deficits:  Vision impairment, Hearing impairment


Implanted or Applied Medical D:  None





Family Medical History


Family Hx:  


Mom: alive





Review of Systems


Constitutional:  No chills; diaphoresis (attributed to work of breathing); No 

dizziness, No fever, No weakness


EENTM:  No hearing loss, No blurred vision, No double vision, No vision loss


Respiratory:  cough (white sputum), dyspnea on exertion; No phlegm; short of b

reath


Cardiovascular:  No chest pain, No edema, No palpitations, No syncope


Gastrointestinal:  No abdominal pain, No constipation, No diarrhea, No melena, 

No nausea, No vomiting


Genitourinary:  No dysuria, No frequency, No hematuria, No hesitancy


Psychiatric/Neurological:  Denies Headache, Denies Numbness, Denies Paresthesia,

Denies Tingling, Denies Weakness





Physical Exam


Physical Exam


Vital Signs





Vital Signs - First Documented








 4/5/21 4/6/21





 19:35 04:36


 


Temp 35.8 


 


Pulse 105 


 


Resp 22 


 


B/P (MAP) 150/71 (97) 


 


Pulse Ox 94 


 


O2 Delivery Nasal Cannula 


 


O2 Flow Rate 3.00 


 


FiO2  28





Capillary Refill : Less Than 3 Seconds


Height, Weight, BMI


Height: 5'4.00"


Weight: 310lbs. 0.0oz. 140.242439qx; 54.30 BMI


Method:Stated


General Appearance:  No Apparent Distress, Obese


Eyes:  Bilateral Eye Normal Inspection, Bilateral Eye PERRL, Bilateral Eye EOMI


HEENT:  PERRL/EOMI; No Pale Conjunctivae (L), No Pale Conjunctivae (R), No 

Scleral Icterus (L), No Scleral Icterus (R)


Neck:  Normal Inspection, Non Tender, Supple


Respiratory:  Chest Non Tender, No Accessory Muscle Use, No Respiratory Distre

ss, Wheezing (very mild)


Cardiovascular:  Regular Rate, Rhythm, No Edema, No Murmur, Normal Peripheral 

Pulses


Gastrointestinal:  Normal Bowel Sounds, No Organomegaly, Non Tender, Soft


Extremity:  Normal Inspection, Non Tender, No Calf Tenderness, No Pedal Edema


Neurologic/Psychiatric:  Alert, Oriented x3, Normal Mood/Affect


Skin:  Normal Color, Warm/Dry





Results


Results/Procedures


Labs


Laboratory Tests


4/5/21 19:40








4/6/21 06:00








Patient resulted labs reviewed.





Assessment/Plan


Assessment and Plan


COPD Exacerbation


* Duoneb 3 ml inh q6h


* Solumedrol 60 mg IV push q6h


* CXR 4/5 and 4/6 show no acute cardiopulmonary process by portable radiography.


* Negative for Covid-19 and for Influenza A and B


* DVT prophylaxis with lovenox 60 mg sq bid


* 96% O2 sat on 3L NC


Hypothyroidism


* taking home daily levothyroxine 1000 mcg 


Tobacco abuse


* Normally smokes half a pack per day, expressed desire to quit today, recommend

  nicotine patch





THANIA PERRY MD 4/7/21 1514:


Assessment/Plan


Admission Diagnosis


COPD Exacerbation


Admission Status:  Inpatient Order (span 2 midnights)


Reason for Inpatient Admission:  


see below





Assessment and Plan


Much improved from arrival and breathing easier. Continue on steroids and 

consult pulmonology as she is not well controlled and needs her rescue albuterol

multiple times per day. Encouraged smoking cessation. Continue to wean oxygen as

able to keep sats >90.





Diagnosis/Problems


Diagnosis/Problems





(1) COPD exacerbation


Status:  Acute


(2) Acute respiratory failure with hypoxemia


Status:  Acute





Supervisory-Addendum Brief


Verification & Attestation


Participated in pt care:  history, MDM, physical


Personally performed:  exam, history, MDM, supervision of care


Care discussed with:  Medical Student


Procedures:  n/a


Results interpretation:  Verified all documentation


Verification and Attestation of Medical Student E/M Service





A medical student performed and documented this service in my presence. I 

reviewed and verified all information documented by the medical student and made

modifications to such information, when appropriate. I personally performed the 

physical exam and medical decision making. 





 Thania Perry, Apr 7, 2021,15:07











EMILY MOHAN MED STUDENT           Apr 6, 2021 12:23


THANIA PERRY MD               Apr 7, 2021 15:14

## 2021-04-06 NOTE — DIAGNOSTIC IMAGING REPORT
INDICATION:  COPD exacerbation with hypoxia.  



TECHNIQUE:  Single view chest 4:19 AM.



CORRELATION STUDY:  04/05/2021



FINDINGS: 

The heart size, mediastinal configuration and pulmonary

vascularity are within normal limits.  

The lungs are clear with no consolidating infiltrate. There is no

significant effusion or pneumothorax. 



IMPRESSION: 

1. Negative for acute abnormality of the chest.



Dictated by: 



  Dictated on workstation # [id number]

## 2021-04-07 VITALS — DIASTOLIC BLOOD PRESSURE: 65 MMHG | SYSTOLIC BLOOD PRESSURE: 135 MMHG

## 2021-04-07 VITALS — DIASTOLIC BLOOD PRESSURE: 68 MMHG | SYSTOLIC BLOOD PRESSURE: 126 MMHG

## 2021-04-07 VITALS — DIASTOLIC BLOOD PRESSURE: 63 MMHG | SYSTOLIC BLOOD PRESSURE: 113 MMHG

## 2021-04-07 VITALS — SYSTOLIC BLOOD PRESSURE: 132 MMHG | DIASTOLIC BLOOD PRESSURE: 61 MMHG

## 2021-04-07 VITALS — SYSTOLIC BLOOD PRESSURE: 113 MMHG | DIASTOLIC BLOOD PRESSURE: 63 MMHG

## 2021-04-07 VITALS — DIASTOLIC BLOOD PRESSURE: 72 MMHG | SYSTOLIC BLOOD PRESSURE: 117 MMHG

## 2021-04-07 RX ADMIN — LEVOTHYROXINE SODIUM SCH MCG: 100 TABLET ORAL at 04:26

## 2021-04-07 RX ADMIN — IPRATROPIUM BROMIDE AND ALBUTEROL SULFATE SCH ML: .5; 3 SOLUTION RESPIRATORY (INHALATION) at 18:30

## 2021-04-07 RX ADMIN — TOPIRAMATE SCH MG: 100 TABLET, FILM COATED ORAL at 21:16

## 2021-04-07 RX ADMIN — SIMVASTATIN SCH MG: 10 TABLET, FILM COATED ORAL at 21:16

## 2021-04-07 RX ADMIN — DULOXETINE HYDROCHLORIDE SCH MG: 30 CAPSULE, DELAYED RELEASE ORAL at 08:24

## 2021-04-07 RX ADMIN — ACETAMINOPHEN PRN MG: 500 TABLET ORAL at 15:18

## 2021-04-07 RX ADMIN — IPRATROPIUM BROMIDE AND ALBUTEROL SULFATE SCH ML: .5; 3 SOLUTION RESPIRATORY (INHALATION) at 10:29

## 2021-04-07 RX ADMIN — TOPIRAMATE SCH MG: 100 TABLET, FILM COATED ORAL at 08:25

## 2021-04-07 RX ADMIN — ENOXAPARIN SODIUM SCH MG: 100 INJECTION SUBCUTANEOUS at 08:24

## 2021-04-07 RX ADMIN — OXYBUTYNIN CHLORIDE SCH MG: 5 TABLET ORAL at 21:16

## 2021-04-07 RX ADMIN — ZOLPIDEM TARTRATE SCH MG: 5 TABLET ORAL at 21:16

## 2021-04-07 RX ADMIN — SODIUM CHLORIDE, SODIUM LACTATE, POTASSIUM CHLORIDE, AND CALCIUM CHLORIDE SCH MLS/HR: 600; 310; 30; 20 INJECTION, SOLUTION INTRAVENOUS at 01:55

## 2021-04-07 RX ADMIN — NAPROXEN SCH MG: 250 TABLET ORAL at 08:25

## 2021-04-07 RX ADMIN — CARVEDILOL SCH MG: 25 TABLET, FILM COATED ORAL at 21:17

## 2021-04-07 RX ADMIN — ACETAMINOPHEN PRN MG: 500 TABLET ORAL at 08:29

## 2021-04-07 RX ADMIN — ENOXAPARIN SODIUM SCH MG: 100 INJECTION SUBCUTANEOUS at 21:17

## 2021-04-07 RX ADMIN — METHYLPREDNISOLONE SODIUM SUCCINATE SCH MG: 125 INJECTION, POWDER, FOR SOLUTION INTRAMUSCULAR; INTRAVENOUS at 11:29

## 2021-04-07 RX ADMIN — NICOTINE SCH MG: 14 PATCH, EXTENDED RELEASE TRANSDERMAL at 08:30

## 2021-04-07 RX ADMIN — NAPROXEN SCH MG: 250 TABLET ORAL at 21:16

## 2021-04-07 RX ADMIN — IPRATROPIUM BROMIDE AND ALBUTEROL SULFATE SCH ML: .5; 3 SOLUTION RESPIRATORY (INHALATION) at 14:27

## 2021-04-07 RX ADMIN — IPRATROPIUM BROMIDE AND ALBUTEROL SULFATE SCH ML: .5; 3 SOLUTION RESPIRATORY (INHALATION) at 02:20

## 2021-04-07 RX ADMIN — ACETAMINOPHEN PRN MG: 500 TABLET ORAL at 21:16

## 2021-04-07 RX ADMIN — METHYLPREDNISOLONE SODIUM SUCCINATE SCH MG: 125 INJECTION, POWDER, FOR SOLUTION INTRAMUSCULAR; INTRAVENOUS at 04:26

## 2021-04-07 NOTE — PULMONARY CONSULTATION
History of Present Illness


History of Present Illness


Date Seen by Provider:  2021


Time Seen by Provider:  07:52


Date of Admission








Allergies and Home Medications


Allergies


Coded Allergies:  


     codeine (Verified  Allergy, Mild, rash, 21)





Home Medications


Acetaminophen 500 Mg Tablet, 1,000 MG PO Q6H PRN for PAIN-MILD (1-4), (Reported)


Albuterol Sulfate 2.5 Mg/3 Ml Vial.neb, 3 ML IH EVERY 4-6 HOURS PRN for 

SHORTNESS OF BREATH, (Reported)


Albuterol Sulfate 6.7 Gm Hfa.aer.ad, 2 PUFF INH Q6H PRN for SHORTNESS OF BREATH,

(Reported)


Budesonide/Formoterol Fumarate 10.2 Gm Hfa.aer.ad, 2 PUFF IH BID PRN for 

SHORTNESS OF BREATH, (Reported)


Desmopressin Acetate 0.1 Mg Tablet, 0.6 MG PO HS, (Reported)


   TAKES 6 (0.1MG) TABS 


Duloxetine HCl 30 Mg Capsule.dr, 30 MG PO DAILY, (Reported)


   TAKES 30MG +60MG TO EQUAL 90MG DAILY 


Duloxetine HCl 60 Mg Capsule.dr, 60 MG PO DAILY, (Reported)


   TAKES 30MG +60MG TO EQUAL 90MG DAILY 


Hydroxyzine HCl 10 Mg Tablet, 10 MG PO BID PRN for ANXIETY, (Reported)


Levothyroxine Sodium 100 Mcg Tablet, 100 MCG PO DAILY, (Reported)


Naproxen 500 Mg Tablet, 500 MG PO BID, (Reported)


Oxybutynin Chloride 10 Mg Tab.er.24, 10 MG PO HS, (Reported)


Simvastatin 10 Mg Tablet, 10 MG PO HS, (Reported)


Topiramate 100 Mg Tablet, 100 MG PO BID, (Reported)


Zolpidem Tartrate 5 Mg Tablet, 5 MG PO HS, (Reported)





Past Medical-Social-Family Hx


Patient Social History


Alcohol Use:  Denies Use


Drug of Choice:  Denies


Smoking Status:  Current Everyday Smoker (1/2 ppd)


Type Used:  Cigarettes (Half pack per day)


2nd Hand Smoke Exposure:  Yes


Recent Infectious Disease Expo:  No


Recent Hopitalizations:  No


Have you traveled recently?:  No


Alcohol Use?:  No





Immunizations Up To Date


Tetanus Booster (TDap):  Unknown


Date of Pneumonia Vaccine:  2011





Seasonal Allergies


Seasonal Allergies:  No





Past Medical History


Surgeries:  Yes ( x3, BILAT TKR, LEFT ANKLE x2, RIGHT ANKLE)


Respiratory:  Yes (COPD)


Asthma, Chronic Bronchitis, COPD


Currently Using CPAP:  No


Currently Using BIPAP:  No


Cardiac:  No


Neurological:  Yes


Reproductive Disorders:  No


Genitourinary:  No


Gastrointestinal:  No


Musculoskeletal:  Yes (ARTHRITIS, RIGHT ANKLE FX)


Arthritis


Endocrine:  Yes


Hypothyroidsim


HEENT:  No (glasses, no teeth)


Loss of Vision:  Bilateral


Hearing Impairment:  Denies


Cancer:  No


Psychosocial:  Yes


Depression


Integumentary:  No


Blood Disorders:  No





Family Medical History





Patient reports no known family medical history.





Mom: alive





Sepsis Event


Evaluation


Height, Weight, BMI


Height: 5'4.00"


Weight: 310lbs. 0.0oz. 140.357667aj; 54.30 BMI


Method:Stated





Exam


Exam





Vital Signs








  Date Time  Temp Pulse Resp B/P (MAP) Pulse Ox O2 Delivery O2 Flow Rate FiO2


 


21 03:46 36.1 113 22 113/63 (80) 97 Nasal Cannula 3.00 


 


21 02:21     96 Nasal Cannula 3.00 


 


21 23:30 35.7 117 22 102/59 (73) 97 Nasal Cannula 3.00 


 


21 20:57     96 Nasal Cannula 3.00 


 


21 20:18      Nasal Cannula 3.00 


 


21 19:45 37.0 110 22 141/63 (89) 96 Nasal Cannula 3.00 


 


21 15:44 37.0 117 22 136/60 (85) 95 Nasal Cannula 3.00 


 


21 15:05     96 Nasal Cannula 3.00 


 


21 11:01 37.3 114 17 123/59 (80) 95 Nasal Cannula 3.00 


 


21 10:57 37.3 114 17 123/59 (80) 95 Nasal Cannula 3.00 


 


21 08:40     94 Nasal Cannula 4.00 


 


21 08:00      Nasal Cannula 3.00 














I & O 


 


 21





 07:00


 


Intake Total 1382 ml


 


Output Total 720 ml


 


Balance 662 ml








Height & Weight


Height: 5'4.00"


Weight: 310lbs. 0.0oz. 140.214640go; 54.30 BMI


Method:Stated


General Appearance:  No Apparent Distress, Obese


HEENT:  PERRL/EOMI; No Pale Conjunctivae (L), No Pale Conjunctivae (R), No 

Scleral Icterus (L), No Scleral Icterus (R)


Neck:  Normal Inspection, Non Tender, Supple


Respiratory:  Chest Non Tender, No Accessory Muscle Use, No Respiratory 

Distress, Wheezing (very mild)


Cardiovascular:  Regular Rate, Rhythm, No Edema, No Murmur, Normal Peripheral 

Pulses


Capillary Refill:  Less Than 3 Seconds


Gastrointestinal:  non tender, soft


Extremity:  Normal Inspection, Non Tender, No Calf Tenderness, No Pedal Edema


Neurologic/Psychiatric:  Alert, Oriented x3, Normal Mood/Affect


Skin:  Normal Color, Warm/Dry





Results


Lab


Laboratory Tests


21 19:40








21 06:00











Assessment/Plan


Assessment/Plan


COPD AE 


   -Solumedrol 


   -Duonebs 


Tobacco dependance 


   -Education 


Hypothyroid 


   -Synthroid











HI DAVIS DO               2021 08:04

## 2021-04-07 NOTE — PROGRESS NOTE - HOSPITALIST
EMILY MOHAN MED STUDENT 4/7/21 1354:


Subjective


HPI/CC On Admission


Date Seen by Provider:  Apr 7, 2021


Time Seen by Provider:  09:00


Ms. Rosario is a 65 year old female seen today due to a COPD exacerbation.  She 

reports slight worsening of her SOB and cough over the past several days, with a

sudden worsening of symptoms yesterday evening while cooking dinner.  She used 

her albuterol inhalers but reports no benefit and even felt worse afterward due 

to breathing hard.  Only other symptom was diaphoresis with shortness of breath.

 She denies having any fevers/chills, dizziness, syncope, CP, palpitations, 

edema, swelling, numbness/tingling, or weakness.


Subjective/Events-last exam


When seen this morning, Ms. Rosario reported feeling much improved, she remains 

SOB but near her normal.  She is enthusiastic about quitting smoking.  Only 

complaint is of a headache last night.  Denies any fevers/chills, CP/SOB, 

dizziness, syncope, n/v, abdominal pain, constipation, diarrhea.





Review of Systems


General:  No Chills, No Fatigue


HEENT:  Head Aches; No Visual Changes, No Sinus Congestion


Pulmonary:  Dyspnea, Cough


Cardiovascular:  No: Chest Pain, Palpitations, Edema


Gastrointestinal:  No: Nausea, Vomiting, Abdominal Pain, Diarrhea, Constipation


Genitourinary:  No Dysuria, No Frequency, No Retention


Neurological:  No: Weakness, Numbness





Objective


Exam


Vital Signs





Vital Signs








  Date Time  Temp Pulse Resp B/P (MAP) Pulse Ox O2 Delivery O2 Flow Rate FiO2


 


4/7/21 12:26  91 20 117/72 (87) 99 Nasal Cannula 2.00 


 


4/7/21 08:00 36.8       


 


4/6/21 04:36        28





Capillary Refill : Less Than 3 Seconds


General Appearance:  No Apparent Distress, Obese


HEENT:  PERRL/EOMI; No Pale Conjunctivae (L), No Pale Conjunctivae (R), No 

Scleral Icterus (L), No Scleral Icterus (R)


Neck:  Normal Inspection, Non Tender, Supple


Respiratory:  Chest Non Tender, Lungs Clear, Normal Breath Sounds, No Accessory 

Muscle Use, No Respiratory Distress; No Wheezing


Cardiovascular:  Regular Rate, Rhythm, No Murmur, Normal Peripheral Pulses


Gastrointestinal:  Normal Bowel Sounds, No Organomegaly, Non Tender, Soft


Extremity:  Normal Capillary Refill, Non Tender, No Calf Tenderness, Pedal Edema

(1+)


Neurologic/Psychiatric:  Alert, Oriented x3, Normal Mood/Affect


Skin:  Normal Color, Warm/Dry





Results/Procedures


Lab


Patient resulted labs reviewed.





Assessment/Plan


Assessment and Plan


Assess & Plan/Chief Complaint


COPD Exacerbation


* Breathing has improved, hope for discharge tomorrow.  Will order home O2 test,

  change solumedrol 60 mg IV to prednisone 40 mg PO.  


* Pulmonology following, recommends continuing duonebs.  Clarified with patient 

  that they ought to take symbicort twice daily every day, as they had been 

  taking it prn.  Recommend outpatient followup with pulmonology to manage COPD 

  medication regimen.


* CXR 4/5 and 4/6 show no acute cardiopulmonary process by portable radiography.


* Negative for Covid-19 and for Influenza A and B


* DVT prophylaxis with lovenox 60 mg sq bid


Hypothyroidism


* taking home daily levothyroxine 1000 mcg 


Tobacco abuse


* Normally smokes half a pack per day, requests nicotine patches for home, will 

  order





THANIA PERRY MD 4/7/21 1516:


Assessment/Plan


Assessment and Plan


Assess & Plan/Chief Complaint


Patient doing much better. Committed to quiting smoking per her report. Son has 

apparently discarding all of his tobacco products. Her breathing has improved. 

If she continues to do well will hopefully be able to titrate off oxygen and DC 

home tomorrow.





Supervisory-Addendum Brief


Verification & Attestation


Participated in pt care:  history, MDM, physical


Personally performed:  exam, history, MDM, supervision of care


Care discussed with:  Medical Student


Procedures:  n/a


Results interpretation:  Verified all documentation


Verification and Attestation of Medical Student E/M Service





A medical student performed and documented this service in my presence. I 

reviewed and verified all information documented by the medical student and made

modifications to such information, when appropriate. I personally performed the 

physical exam and medical decision making. 





 Thania Perry, Apr 7, 2021,15:15











EMILY MOHAN MED STUDENT           Apr 7, 2021 13:54


THANIA PERRY MD               Apr 7, 2021 15:16

## 2021-04-08 VITALS — DIASTOLIC BLOOD PRESSURE: 83 MMHG | SYSTOLIC BLOOD PRESSURE: 135 MMHG

## 2021-04-08 VITALS — DIASTOLIC BLOOD PRESSURE: 87 MMHG | SYSTOLIC BLOOD PRESSURE: 145 MMHG

## 2021-04-08 VITALS — SYSTOLIC BLOOD PRESSURE: 135 MMHG | DIASTOLIC BLOOD PRESSURE: 83 MMHG

## 2021-04-08 LAB
BUN/CREAT SERPL: 31
CALCIUM SERPL-MCNC: 7.8 MG/DL (ref 8.5–10.1)
CHLORIDE SERPL-SCNC: 109 MMOL/L (ref 98–107)
CO2 SERPL-SCNC: 21 MMOL/L (ref 21–32)
CREAT SERPL-MCNC: 0.83 MG/DL (ref 0.6–1.3)
GFR SERPLBLD BASED ON 1.73 SQ M-ARVRAT: > 60 ML/MIN
GLUCOSE SERPL-MCNC: 115 MG/DL (ref 70–105)
HCT VFR BLD CALC: 35 % (ref 35–52)
HGB BLD-MCNC: 11.2 G/DL (ref 11.5–16)
MCH RBC QN AUTO: 28 PG (ref 25–34)
MCHC RBC AUTO-ENTMCNC: 32 G/DL (ref 32–36)
MCV RBC AUTO: 87 FL (ref 80–99)
PLATELET # BLD: 118 10^3/UL (ref 130–400)
PMV BLD AUTO: 10.6 FL (ref 9–12.2)
POTASSIUM SERPL-SCNC: 3.5 MMOL/L (ref 3.6–5)
SODIUM SERPL-SCNC: 141 MMOL/L (ref 135–145)
WBC # BLD AUTO: 9.2 10^3/UL (ref 4.3–11)

## 2021-04-08 RX ADMIN — IPRATROPIUM BROMIDE AND ALBUTEROL SULFATE SCH ML: .5; 3 SOLUTION RESPIRATORY (INHALATION) at 07:46

## 2021-04-08 RX ADMIN — TOPIRAMATE SCH MG: 100 TABLET, FILM COATED ORAL at 08:34

## 2021-04-08 RX ADMIN — LEVOTHYROXINE SODIUM SCH MCG: 100 TABLET ORAL at 04:59

## 2021-04-08 RX ADMIN — ACETAMINOPHEN PRN MG: 500 TABLET ORAL at 14:55

## 2021-04-08 RX ADMIN — DULOXETINE HYDROCHLORIDE SCH MG: 30 CAPSULE, DELAYED RELEASE ORAL at 08:34

## 2021-04-08 RX ADMIN — IPRATROPIUM BROMIDE AND ALBUTEROL SULFATE SCH ML: .5; 3 SOLUTION RESPIRATORY (INHALATION) at 02:06

## 2021-04-08 RX ADMIN — ENOXAPARIN SODIUM SCH MG: 100 INJECTION SUBCUTANEOUS at 08:35

## 2021-04-08 RX ADMIN — NICOTINE SCH MG: 14 PATCH, EXTENDED RELEASE TRANSDERMAL at 08:34

## 2021-04-08 RX ADMIN — NAPROXEN SCH MG: 250 TABLET ORAL at 08:41

## 2021-04-08 NOTE — PULMONARY PROGRESS NOTE
Subjective


Time Seen by a Provider:  07:14


Subjective/Events-last exam


No complications noted.





Sepsis Event


Evaluation


Height, Weight, BMI


Height: 5'4.00"


Weight: 310lbs. 0.0oz. 140.978401gj; 54.30 BMI


Method:Stated





Exam


Exam





Vital Signs








  Date Time  Temp Pulse Resp B/P (MAP) Pulse Ox O2 Delivery O2 Flow Rate FiO2


 


4/8/21 02:06     96 Nasal Cannula 1.00 


 


4/7/21 23:32 36.2 107 22 126/68 (87) 96 Nasal Cannula 1.00 


 


4/7/21 20:05      Nasal Cannula 1.00 


 


4/7/21 19:44 36.2 107 20 135/65 (88) 97 Nasal Cannula 1.00 


 


4/7/21 18:30     98 Nasal Cannula 1.00 


 


4/7/21 15:54 36.4 105 18 132/61 (84) 97 Nasal Cannula 1.00 


 


4/7/21 14:27     99 Nasal Cannula 2.00 


 


4/7/21 12:26  91 20 117/72 (87) 99 Nasal Cannula 2.00 


 


4/7/21 10:29     95 Nasal Cannula 3.00 


 


4/7/21 08:00      Nasal Cannula 2.00 


 


4/7/21 08:00 36.8 112 26 113/63 (80) 96 Nasal Cannula 3.00 





       3.00 














I & O 


 


 4/8/21





 07:00


 


Intake Total 1660 ml


 


Output Total 600 ml


 


Balance 1060 ml








Height & Weight


Height: 5'4.00"


Weight: 310lbs. 0.0oz. 140.111527go; 54.30 BMI


Method:Stated


General Appearance:  No Apparent Distress, Obese


HEENT:  PERRL/EOMI; No Pale Conjunctivae (L), No Pale Conjunctivae (R), No 

Scleral Icterus (L), No Scleral Icterus (R)


Neck:  Normal Inspection, Non Tender, Supple


Respiratory:  Chest Non Tender, Lungs Clear, Normal Breath Sounds, No Accessory 

Muscle Use, No Respiratory Distress; No Wheezing


Cardiovascular:  Regular Rate, Rhythm, No Murmur, Normal Peripheral Pulses


Capillary Refill:  Less Than 3 Seconds


Gastrointestinal:  non tender, soft


Extremity:  Normal Capillary Refill, Non Tender, No Calf Tenderness, Pedal Edema

(1+)


Neurologic/Psychiatric:  Alert, Oriented x3, Normal Mood/Affect


Skin:  Normal Color, Warm/Dry





Results


Lab


Laboratory Tests


4/8/21 05:49











Assessment/Plan


Assessment/Plan


COPD AE 


   -Prednisone 


   -Duonebs and AirDuo 


Tobacco dependance 


   -Education 


Hypothyroid 


   -Synthroid











HI DAVIS DO               Apr 8, 2021 07:16

## 2021-04-08 NOTE — D/C HH FACE TO FACE ORDER
D/C  Face to Face Orders


Instructions for Patient


[facility], [phone]


Patient Instructions/FollowUp:  


Please continue to take your medcations as written. Please follow up with


Dr Moore and Dr Courtney as scheduled.


Physician to follow Patient:  Dr Moore


Discharge Diet for Home:  No Restrictions





Patient Data-Allergies,Ht & Wt


Patient Allergies:  


Coded Allergies:  


     codeine (Verified  Allergy, Mild, rash, 4/6/21)


Height (Feet):  5


Height (Inches):  4.00


Weight (Pounds):  310


Weight (Ounces):  0.0





Home Health Need/Face to Face


Date of Face to Face:  Apr 8, 2021


Clinical Findings:  Generalized weakness and fatigue, Shortness of breath


I have seen Pt face-to-face:  Yes


Discharged To:  Home


Diagnosis/Conditions:  


COPD


Patient is Homebound due to:  Shortness of breath/distress


Homebound Status


   Due to the above stated illness, injury or surgical procedure (medical 

condition or diagnosis) and associated clinical findings, the patient is 

homebound because of his/her inability to leave home except with aid of a 

supportive device and/or person AND leaving the home requires a considerable and

taxing effort or is medically contraindicated.


Pt req the following assistanc:  Aid of another person





Home Health Nursing Orders


Home Health Services Order:  Nursing Services, Physical Therapy-Evaluate & Treat





Med set up





Home Health Infusion Therapy


Line Start Date:  Apr 5, 2021





Therapy Orders


Therapy Orders:  Physical Therapy, PT to assess for OT


Therapy Specific Orders:  Eval assistive deivces, Teach enviro 

modifications/safety, Gait training, Increase strength/endurance


Certify Stmt


I certify that this patient is under my care and that I, a nurse practitioner or

a physician; a assistant working with me, had a face to face encounter that -

meets the physician face to face encounter requirements with this patient as 

dated.











THANIA KHAN MD               Apr 8, 2021 10:22

## 2021-04-08 NOTE — DISCHARGE SUMMARY
EMILY MOHAN MED STUDENT 4/8/21 0937:


Diagnosis/Chief Complaint


Date of Admission


Apr 5, 2021 at 20:40


Date of Discharge





Discharge Date:  Apr 8, 2021


Admission Diagnosis


COPD Exacerbation


Primary Care


Bria Moore MD


Discharge Diagnosis





(1) COPD exacerbation


Status:  Acute


(2) Acute respiratory failure with hypoxemia


Status:  Acute





Discharge Summary


Discharge Physical Exam


Allergies:  


Coded Allergies:  


     codeine (Verified  Allergy, Mild, rash, 4/6/21)


Vitals & I&Os





Vital Signs








  Date Time  Temp Pulse Resp B/P (MAP) Pulse Ox O2 Delivery O2 Flow Rate FiO2


 


4/8/21 10:23  122   87   


 


4/8/21 08:53      Room Air  


 


4/8/21 07:42 35.8  18 145/87 (106)    


 


4/8/21 02:06       1.00 


 


4/6/21 04:36        28








General Appearance:  No Apparent Distress, Obese


HEENT:  PERRL/EOMI; No Pale Conjunctivae (L), No Pale Conjunctivae (R), No 

Scleral Icterus (L), No Scleral Icterus (R)


Respiratory:  Lungs Clear, Normal Breath Sounds, No Accessory Muscle Use, No 

Respiratory Distress


Cardiovascular:  Regular Rate, Rhythm, No Murmur, Normal Peripheral Pulses


Gastrointestinal:  Normal Bowel Sounds, No Organomegaly, Non Tender, Soft


Extremity:  Normal Capillary Refill, Non Tender, No Calf Tenderness, Pedal Edema

(1+ pedal edema bilaterally)


Skin:  Normal Color, Warm/Dry


Neurologic/Psychiatric:  Alert, Oriented x3, Normal Mood/Affect





Hospital Course


Ms. Rosario is a 65 year old female admitted the evening of 4/5 due to a COPD 

exacerbation.  For several days prior to admission she felt slight worsening of 

her SOB and cough, but that afternoon she had a sudden increase of her SOB and 

cough while cooking dinner.  She reports no benefit from her rescue albuterol 

inhalers.  Denied having any fevers or chills, dizziness, chest pain, or 

palpitations at that time.  Her chest x-ray showed no acute abnormalities and 

she tested negative for Covid-19 and for influenza A or B.  She was started on 

duoneb and solumedrol, and had good oxygen saturation with 3L NC.  On 4/6 she 

reported improvement in her symptoms, and expressed enthusiasm about quitting 

smoking, she had her son throw away all the cigarettes she had at home.  She 

reported using her rescue albuterol multiple times daily as well as symbicort pr

n, and pulmonology was consulted to help improve her medication regimen for COPD

control.  On 4/8 she was weaned off of oxygen, and reported that her breathing 

was at her normal.  Home O2 test was done, and she qualified for 2L while 

ambulatory.  She was discharged on 4/8 with prednisone 40 mg for 3 days, 

educated to take home medications correctly, and nicotine patches, and was re

commended to to follow up with pulmonary rehab, pulmonology in 1 week, PCP in 2-

3 weeks.


Labs (last 24 hrs)


Laboratory Tests


4/8/21 05:49: 


White Blood Count 9.2, Red Blood Count 4.01, Hemoglobin 11.2L, Hematocrit 35, 

Mean Corpuscular Volume 87, Mean Corpuscular Hemoglobin 28, Mean Corpuscular 

Hemoglobin Concent 32, Red Cell Distribution Width 14.2, Platelet Count 118L, 

Mean Platelet Volume 10.6, Sodium Level 141, Potassium Level 3.5L, Chloride 

Level 109H, Carbon Dioxide Level 21, Anion Gap 11, Blood Urea Nitrogen 26H, 

Creatinine 0.83, Estimat Glomerular Filtration Rate > 60, BUN/Creatinine Ratio 

31, Glucose Level 115H, Calcium Level 7.8L





Microbiology


4/5/21 Influenza Types A,B Antigen (JOSE) - Final, Complete


         


Patient resulted labs reviewed.


Pending Labs


Laboratory Tests


4/8/21 05:49: 


White Blood Count 9.2, Red Blood Count 4.01, Hemoglobin 11.2, Hematocrit 35, 

Mean Corpuscular Volume 87, Mean Corpuscular Hemoglobin 28, Mean Corpuscular 

Hemoglobin Concent 32, Red Cell Distribution Width 14.2, Platelet Count 118, 

Mean Platelet Volume 10.6, Sodium Level 141, Potassium Level 3.5, Chloride Level

109, Carbon Dioxide Level 21, Anion Gap 11, Blood Urea Nitrogen 26, Creatinine 

0.83, Estimat Glomerular Filtration Rate > 60, BUN/Creatinine Ratio 31, Glucose 

Level 115, Calcium Level 7.8








Discharge


Home Medications:





Active Scripts


Active


Prednisone 20 Mg Tab 40 Mg PO DAILY@0700


Nicoderm Cq (Nicotine) 1 Each Patch.td24 14 Mg TD DAILY@0900


Reported


Tylenol Extra Strength (Acetaminophen) 500 Mg Tablet 1,000 Mg PO Q6H PRN


Proventil Hfa (Albuterol Sulfate) 6.7 Gm Hfa.aer.ad 2 Puff INH Q6H PRN


Naproxen 500 Mg Tablet 500 Mg PO BID


Desmopressin Acetate 0.1 Mg Tablet 0.6 Mg PO HS


     TAKES 6 (0.1MG) TABS


Hydroxyzine HCl 10 Mg Tablet 10 Mg PO BID PRN


Zolpidem Tartrate 5 Mg Tablet 5 Mg PO HS


Albuterol Sulfate 2.5 Mg/3 Ml Vial.neb 3 Ml IH EVERY 4-6 HOURS PRN


Duloxetine HCl 60 Mg Capsule.dr 60 Mg PO DAILY


     TAKES 30MG +60MG TO EQUAL 90MG DAILY


Duloxetine HCl 30 Mg Capsule.dr 30 Mg PO DAILY


     TAKES 30MG +60MG TO EQUAL 90MG DAILY


Symbicort 160-4.5 Mcg Inhaler (Budesonide/Formoterol Fumarate) 10.2 Gm 

Hfa.aer.ad 2 Puff IH BID PRN


Simvastatin 10 Mg Tablet 10 Mg PO HS


Levothyroxine Sodium 100 Mcg Tablet 100 Mcg PO DAILY


Topiramate 100 Mg Tablet 100 Mg PO BID


Oxybutynin Chloride ER (Oxybutynin Chloride) 10 Mg Tab.er.24 10 Mg PO HS





Instructions to patient/family


Please see electronic discharge instructions given to patient.





THANIA PERRY MD 4/8/21 1516:


Discharge Summary


Discharge Physical Exam


Allergies:  


Coded Allergies:  


     codeine (Verified  Allergy, Mild, rash, 4/6/21)





Discussion & Recommendations


Discharge Planning:  >30 minutes discharge planning





Supervisory-Addendum Brief


Verification & Attestation


Participated in pt care:  history, MDM, physical


Personally performed:  exam, history, MDM, supervision of care


Care discussed with:  Medical Student


Procedures:  n/a


Results interpretation:  Verified all documentation


Verification and Attestation of Medical Student E/M Service





A medical student performed and documented this service in my presence. I 

reviewed and verified all information documented by the medical student and made

modifications to such information, when appropriate. I personally performed the 

physical exam and medical decision making. 





 Thania Perry, Apr 8, 2021,15:15











EMILY MOHAN MED STUDENT           Apr 8, 2021 09:37


THANIA PERRY MD               Apr 8, 2021 15:16

## 2021-04-08 NOTE — DISCHARGE INST-SIMPLE/STANDARD
Discharge Inst-Standard


Discharge Medications


New, Converted or Re-Newed RX:  Transmitted to Pharmacy





Patient Instructions/Follow Up


Plan of Care/Instructions/FU:  


Please continue to take your medications as written. Please follow up with


your primary care doctor to follow up with your PCP. Please continue your


work to quit smoking.


Activity as Tolerated:  Yes


Discharge Diet:  No Restrictions


Return to The Hospital For:  


Chest pain, shortness of breath, fever, if you feel you are getting worse.











THANIA KHAN MD               Apr 8, 2021 09:32

## 2021-05-04 ENCOUNTER — HOSPITAL ENCOUNTER (OUTPATIENT)
Dept: HOSPITAL 75 - RT | Age: 66
End: 2021-05-04
Attending: NURSE PRACTITIONER
Payer: MEDICAID

## 2021-05-04 DIAGNOSIS — J44.9: Primary | ICD-10-CM

## 2021-05-04 PROCEDURE — 94060 EVALUATION OF WHEEZING: CPT

## 2021-05-04 PROCEDURE — 94729 DIFFUSING CAPACITY: CPT

## 2021-05-04 PROCEDURE — 94726 PLETHYSMOGRAPHY LUNG VOLUMES: CPT

## 2021-09-16 ENCOUNTER — HOSPITAL ENCOUNTER (OUTPATIENT)
Dept: HOSPITAL 75 - RAD | Age: 66
End: 2021-09-16
Attending: NURSE PRACTITIONER
Payer: MEDICAID

## 2021-09-16 DIAGNOSIS — N63.23: Primary | ICD-10-CM

## 2021-09-16 PROCEDURE — 77066 DX MAMMO INCL CAD BI: CPT

## 2021-09-16 PROCEDURE — 77062 BREAST TOMOSYNTHESIS BI: CPT

## 2021-09-16 PROCEDURE — 76642 ULTRASOUND BREAST LIMITED: CPT

## 2021-09-16 NOTE — DIAGNOSTIC IMAGING REPORT
INDICATION: Palpable lump 5:00 location left breast.



Correlation is made to prior mammogram 2/26/2018 and 7/31/2015.



2-D and 3-D bilateral diagnostic mammography was performed with

CAD. BB markers placed at the area of palpable abnormality in the

upper and outer aspect of the left breast.



Scattered fibroglandular densities are noted bilaterally. There

are intraparenchymal lymph nodes in the outer right breast. No

spiculated mass or malignant appearing microcalcifications are

seen. Axillae are unremarkable.



IMPRESSION: BI-RADS 0



No mammographic features suspicious for malignancy are

identified. Even so, directed sonographic interrogation of the

area palpable abnormality left breast is recommended and will be

performed today.



ACR BI-RADS Category 0: Incomplete. (Needs additional imaging

evaluation).

Result letter will be mailed to the patient.

Note: At least 10% of breast cancer is not imaged by mammography.



Dictated by: 



  Dictated on workstation # [id number]

## 2021-09-16 NOTE — DIAGNOSTIC IMAGING REPORT
INDICATION: Palpable lump left breast.



Correlation is made with diagnostic mammogram earlier same day.



Sonographic interrogation of the area of lump 3-4 o'clock

location left breast was performed. There is a 5 mm x 5 mm

circumscribed hyperechoic nodule at this location 50 cm from the

nipple consistent with small lipoma. No concerning mass is

identified.



IMPRESSION: 5 mm lipoma at the area of palpable abnormality 3-4

o'clock location, 15 cm from the nipple. Patient may return to

routine annual screening mammography.



BI-RADS Category 2



ACR BI-RADS Category 2: Benign findings.

Result letter will be mailed to the patient.

Note: At least 10% of breast cancer is not imaged by mammography.



Dictated by: 



  Dictated on workstation # [id number]

## 2022-09-21 ENCOUNTER — HOSPITAL ENCOUNTER (OUTPATIENT)
Dept: HOSPITAL 75 - RAD | Age: 67
End: 2022-09-21
Attending: STUDENT IN AN ORGANIZED HEALTH CARE EDUCATION/TRAINING PROGRAM
Payer: MEDICAID

## 2022-09-21 DIAGNOSIS — Z12.31: Primary | ICD-10-CM

## 2022-09-21 DIAGNOSIS — Z85.3: ICD-10-CM

## 2022-09-21 PROCEDURE — 77067 SCR MAMMO BI INCL CAD: CPT

## 2022-09-21 PROCEDURE — 77063 BREAST TOMOSYNTHESIS BI: CPT

## 2022-09-22 NOTE — DIAGNOSTIC IMAGING REPORT
Indication: Routine screening.



Comparison is made with prior mammograms from 09/16/2021 and

02/26/2018.



2-D and 3-D bilateral screening mammography was performed with

CAD.



Scattered fibroglandular densities are identified bilaterally.

The parenchymal pattern is stable. No spiculated mass or

malignant appearing microcalcifications are seen. Benign nodules

in both breasts are stable. Axillae are unremarkable.



IMPRESSION: BI-RADS Category 2



No mammographic features suspicious for malignancy are

identified.



ACR BI-RADS Category 2: Benign findings.

Result letter will be mailed to the patient.

Note: At least 10% of breast cancer is not imaged by mammography.



Dictated by: 



  Dictated on workstation # [id number]

## 2023-06-19 ENCOUNTER — HOSPITAL ENCOUNTER (OUTPATIENT)
Dept: HOSPITAL 75 - RAD | Age: 68
End: 2023-06-19
Attending: STUDENT IN AN ORGANIZED HEALTH CARE EDUCATION/TRAINING PROGRAM
Payer: MEDICAID

## 2023-06-19 DIAGNOSIS — M25.571: Primary | ICD-10-CM

## 2023-06-19 DIAGNOSIS — Z98.1: ICD-10-CM

## 2023-06-19 PROCEDURE — 73610 X-RAY EXAM OF ANKLE: CPT

## 2023-06-19 NOTE — DIAGNOSTIC IMAGING REPORT
INDICATION: Right ankle pain



AP, oblique and lateral views of right ankle are obtained with

comparison made to study of 05/01/2017.



There has been ankle and hindfoot arthrodesis with dystrophic

calcification and marginal spurring along the posterior and

plantar aspect of the hindfoot and ankle. Does appear to be bony

demineralization of the ankle and hindfoot bones however no

fracture is seen. There is no definite orthopedic hardware

complication.



IMPRESSION: Surgical ankle and hindfoot arthrodesis without

evidence of acute complication or other acute abnormality.



Dictated by: 



  Dictated on workstation # [id number]